# Patient Record
Sex: MALE | Race: WHITE | URBAN - METROPOLITAN AREA
[De-identification: names, ages, dates, MRNs, and addresses within clinical notes are randomized per-mention and may not be internally consistent; named-entity substitution may affect disease eponyms.]

---

## 2022-08-17 ENCOUNTER — EMERGENCY (EMERGENCY)
Facility: HOSPITAL | Age: 57
LOS: 0 days | Discharge: HOME | End: 2022-08-17
Attending: EMERGENCY MEDICINE
Payer: COMMERCIAL

## 2022-08-17 VITALS
SYSTOLIC BLOOD PRESSURE: 144 MMHG | OXYGEN SATURATION: 98 % | RESPIRATION RATE: 18 BRPM | TEMPERATURE: 99 F | HEART RATE: 78 BPM | DIASTOLIC BLOOD PRESSURE: 90 MMHG

## 2022-08-17 VITALS — HEIGHT: 70 IN | WEIGHT: 240.08 LBS

## 2022-08-17 DIAGNOSIS — Z90.49 ACQUIRED ABSENCE OF OTHER SPECIFIED PARTS OF DIGESTIVE TRACT: ICD-10-CM

## 2022-08-17 DIAGNOSIS — R11.0 NAUSEA: ICD-10-CM

## 2022-08-17 DIAGNOSIS — R35.0 FREQUENCY OF MICTURITION: ICD-10-CM

## 2022-08-17 DIAGNOSIS — Z20.822 CONTACT WITH AND (SUSPECTED) EXPOSURE TO COVID-19: ICD-10-CM

## 2022-08-17 DIAGNOSIS — R10.9 UNSPECIFIED ABDOMINAL PAIN: ICD-10-CM

## 2022-08-17 DIAGNOSIS — Z88.0 ALLERGY STATUS TO PENICILLIN: ICD-10-CM

## 2022-08-17 DIAGNOSIS — N20.0 CALCULUS OF KIDNEY: ICD-10-CM

## 2022-08-17 LAB
ALBUMIN SERPL ELPH-MCNC: 3.6 G/DL — SIGNIFICANT CHANGE UP (ref 3.3–5)
ALP SERPL-CCNC: 65 U/L — SIGNIFICANT CHANGE UP (ref 40–120)
ALT FLD-CCNC: 51 U/L — SIGNIFICANT CHANGE UP (ref 12–78)
ANION GAP SERPL CALC-SCNC: 8 MMOL/L — SIGNIFICANT CHANGE UP (ref 5–17)
APPEARANCE UR: CLEAR — SIGNIFICANT CHANGE UP
AST SERPL-CCNC: 23 U/L — SIGNIFICANT CHANGE UP (ref 15–37)
BASOPHILS # BLD AUTO: 0.04 K/UL — SIGNIFICANT CHANGE UP (ref 0–0.2)
BASOPHILS NFR BLD AUTO: 0.6 % — SIGNIFICANT CHANGE UP (ref 0–2)
BILIRUB SERPL-MCNC: 1.1 MG/DL — SIGNIFICANT CHANGE UP (ref 0.2–1.2)
BILIRUB UR-MCNC: NEGATIVE — SIGNIFICANT CHANGE UP
BUN SERPL-MCNC: 15 MG/DL — SIGNIFICANT CHANGE UP (ref 7–23)
CALCIUM SERPL-MCNC: 9.1 MG/DL — SIGNIFICANT CHANGE UP (ref 8.5–10.1)
CHLORIDE SERPL-SCNC: 111 MMOL/L — HIGH (ref 96–108)
CO2 SERPL-SCNC: 21 MMOL/L — LOW (ref 22–31)
COLOR SPEC: YELLOW — SIGNIFICANT CHANGE UP
CREAT SERPL-MCNC: 1.32 MG/DL — HIGH (ref 0.5–1.3)
DIFF PNL FLD: ABNORMAL
EGFR: 64 ML/MIN/1.73M2 — SIGNIFICANT CHANGE UP
EOSINOPHIL # BLD AUTO: 0.04 K/UL — SIGNIFICANT CHANGE UP (ref 0–0.5)
EOSINOPHIL NFR BLD AUTO: 0.6 % — SIGNIFICANT CHANGE UP (ref 0–6)
FLUAV AG NPH QL: SIGNIFICANT CHANGE UP
FLUBV AG NPH QL: SIGNIFICANT CHANGE UP
GLUCOSE SERPL-MCNC: 160 MG/DL — HIGH (ref 70–99)
GLUCOSE UR QL: NEGATIVE — SIGNIFICANT CHANGE UP
HCT VFR BLD CALC: 42.9 % — SIGNIFICANT CHANGE UP (ref 39–50)
HGB BLD-MCNC: 14.9 G/DL — SIGNIFICANT CHANGE UP (ref 13–17)
IMM GRANULOCYTES NFR BLD AUTO: 0.3 % — SIGNIFICANT CHANGE UP (ref 0–1.5)
KETONES UR-MCNC: NEGATIVE — SIGNIFICANT CHANGE UP
LACTATE SERPL-SCNC: 1.4 MMOL/L — SIGNIFICANT CHANGE UP (ref 0.7–2)
LEUKOCYTE ESTERASE UR-ACNC: NEGATIVE — SIGNIFICANT CHANGE UP
LIDOCAIN IGE QN: 158 U/L — SIGNIFICANT CHANGE UP (ref 73–393)
LYMPHOCYTES # BLD AUTO: 1.13 K/UL — SIGNIFICANT CHANGE UP (ref 1–3.3)
LYMPHOCYTES # BLD AUTO: 16 % — SIGNIFICANT CHANGE UP (ref 13–44)
MCHC RBC-ENTMCNC: 33.3 PG — SIGNIFICANT CHANGE UP (ref 27–34)
MCHC RBC-ENTMCNC: 34.7 GM/DL — SIGNIFICANT CHANGE UP (ref 32–36)
MCV RBC AUTO: 96 FL — SIGNIFICANT CHANGE UP (ref 80–100)
MONOCYTES # BLD AUTO: 0.55 K/UL — SIGNIFICANT CHANGE UP (ref 0–0.9)
MONOCYTES NFR BLD AUTO: 7.8 % — SIGNIFICANT CHANGE UP (ref 2–14)
NEUTROPHILS # BLD AUTO: 5.28 K/UL — SIGNIFICANT CHANGE UP (ref 1.8–7.4)
NEUTROPHILS NFR BLD AUTO: 74.7 % — SIGNIFICANT CHANGE UP (ref 43–77)
NITRITE UR-MCNC: NEGATIVE — SIGNIFICANT CHANGE UP
PH UR: 5 — SIGNIFICANT CHANGE UP (ref 5–8)
PLATELET # BLD AUTO: 215 K/UL — SIGNIFICANT CHANGE UP (ref 150–400)
POTASSIUM SERPL-MCNC: 3.7 MMOL/L — SIGNIFICANT CHANGE UP (ref 3.5–5.3)
POTASSIUM SERPL-SCNC: 3.7 MMOL/L — SIGNIFICANT CHANGE UP (ref 3.5–5.3)
PROT SERPL-MCNC: 7.2 GM/DL — SIGNIFICANT CHANGE UP (ref 6–8.3)
PROT UR-MCNC: NEGATIVE — SIGNIFICANT CHANGE UP
RBC # BLD: 4.47 M/UL — SIGNIFICANT CHANGE UP (ref 4.2–5.8)
RBC # FLD: 12.6 % — SIGNIFICANT CHANGE UP (ref 10.3–14.5)
RSV RNA NPH QL NAA+NON-PROBE: SIGNIFICANT CHANGE UP
SARS-COV-2 RNA SPEC QL NAA+PROBE: SIGNIFICANT CHANGE UP
SODIUM SERPL-SCNC: 140 MMOL/L — SIGNIFICANT CHANGE UP (ref 135–145)
SP GR SPEC: 1.03 — HIGH (ref 1.01–1.02)
UROBILINOGEN FLD QL: NEGATIVE — SIGNIFICANT CHANGE UP
WBC # BLD: 7.06 K/UL — SIGNIFICANT CHANGE UP (ref 3.8–10.5)
WBC # FLD AUTO: 7.06 K/UL — SIGNIFICANT CHANGE UP (ref 3.8–10.5)

## 2022-08-17 PROCEDURE — 36415 COLL VENOUS BLD VENIPUNCTURE: CPT

## 2022-08-17 PROCEDURE — 0241U: CPT

## 2022-08-17 PROCEDURE — 81001 URINALYSIS AUTO W/SCOPE: CPT

## 2022-08-17 PROCEDURE — 87086 URINE CULTURE/COLONY COUNT: CPT

## 2022-08-17 PROCEDURE — 80053 COMPREHEN METABOLIC PANEL: CPT

## 2022-08-17 PROCEDURE — 83605 ASSAY OF LACTIC ACID: CPT

## 2022-08-17 PROCEDURE — 99285 EMERGENCY DEPT VISIT HI MDM: CPT

## 2022-08-17 PROCEDURE — 85025 COMPLETE CBC W/AUTO DIFF WBC: CPT

## 2022-08-17 PROCEDURE — 74176 CT ABD & PELVIS W/O CONTRAST: CPT | Mod: 26,MA

## 2022-08-17 PROCEDURE — 74176 CT ABD & PELVIS W/O CONTRAST: CPT | Mod: MA

## 2022-08-17 PROCEDURE — 99284 EMERGENCY DEPT VISIT MOD MDM: CPT | Mod: 25

## 2022-08-17 PROCEDURE — 83690 ASSAY OF LIPASE: CPT

## 2022-08-17 PROCEDURE — 96374 THER/PROPH/DIAG INJ IV PUSH: CPT

## 2022-08-17 RX ORDER — SODIUM CHLORIDE 9 MG/ML
1000 INJECTION INTRAMUSCULAR; INTRAVENOUS; SUBCUTANEOUS ONCE
Refills: 0 | Status: DISCONTINUED | OUTPATIENT
Start: 2022-08-17 | End: 2022-08-17

## 2022-08-17 RX ORDER — ONDANSETRON 8 MG/1
4 TABLET, FILM COATED ORAL ONCE
Refills: 0 | Status: DISCONTINUED | OUTPATIENT
Start: 2022-08-17 | End: 2022-08-17

## 2022-08-17 RX ORDER — MORPHINE SULFATE 50 MG/1
4 CAPSULE, EXTENDED RELEASE ORAL ONCE
Refills: 0 | Status: DISCONTINUED | OUTPATIENT
Start: 2022-08-17 | End: 2022-08-17

## 2022-08-17 RX ORDER — TAMSULOSIN HYDROCHLORIDE 0.4 MG/1
1 CAPSULE ORAL
Qty: 7 | Refills: 0
Start: 2022-08-17 | End: 2022-08-23

## 2022-08-17 RX ORDER — CEFTRIAXONE 500 MG/1
1000 INJECTION, POWDER, FOR SOLUTION INTRAMUSCULAR; INTRAVENOUS ONCE
Refills: 0 | Status: DISCONTINUED | OUTPATIENT
Start: 2022-08-17 | End: 2022-08-17

## 2022-08-17 RX ORDER — ONDANSETRON 8 MG/1
1 TABLET, FILM COATED ORAL
Qty: 10 | Refills: 0
Start: 2022-08-17

## 2022-08-17 RX ORDER — KETOROLAC TROMETHAMINE 30 MG/ML
15 SYRINGE (ML) INJECTION ONCE
Refills: 0 | Status: DISCONTINUED | OUTPATIENT
Start: 2022-08-17 | End: 2022-08-17

## 2022-08-17 RX ADMIN — Medication 15 MILLIGRAM(S): at 22:25

## 2022-08-17 NOTE — ED STATDOCS - CARE PROVIDER_API CALL
Negro Rios)  Gastroenterology; Internal Medicine  [address] [place] [address]  Phone: [phone]  Fax: [phone]  Follow Up Time:     Ruddy Gross)  Gastroenterology; Internal Medicine  [address] [place] [address]  Phone: [phone]  Fax: [phone]  Follow Up Time:

## 2022-08-17 NOTE — ED STATDOCS - ATTENDING APP SHARED VISIT CONTRIBUTION OF CARE
I, Valentín Troy MD, personally saw the patient with ZACHARY.  I have personally performed a face to face diagnostic evaluation on this patient.  I have reviewed the ZACHARY note and agree with the history, exam, and plan of care, except as noted.

## 2022-08-17 NOTE — ED STATDOCS - PHYSICAL EXAMINATION
General: AAOx3, NAD  HEENT: NCAT  Cardiac: Normal rate and rhythym, no murmurs, normal peripheral perfusion  Respiratory: Normal rate and effort. CTAB  GI: Soft, nondistended, mild RLQ TTP  Neuro: No focal deficits. JONES equally x4, sensation to light touch intact throughout  MSK: FROMx4, no focal bony tenderness, no peripheral edema  Skin: No rash

## 2022-08-17 NOTE — ED STATDOCS - NSFOLLOWUPINSTRUCTIONS_ED_ALL_ED_FT
FOLLOW UP WITH YOUR UROLOGIST THIS WEEK. FOLLOW UP WITH A GASTROENTEROLOGIST FOR YOUR GALLSTONES, FATTY LIVER, AND DIVERTICULOSIS. YOU SHOULD GET A COLONOSCOPY WITHIN THE NEXT FEW MONTHS. FOLLOW UP WITH YOUR PRIMARY DOCTOR ABOUT THE LUNG NODULE. YOU SHOULD GET A CAT SCAN OF YOUR CHEST AS AN OUTPATIENT. RETURN TO ER FOR ANY WORSENING SYMPTOMS OR NEW CONCERNS.     Kidney Stones    WHAT YOU NEED TO KNOW:    Kidney stones form in the urinary system when the water and waste in your urine are out of balance. When this happens, certain types of waste crystals separate from the urine. The crystals build up and form kidney stones. You may have more than one kidney stone.     DISCHARGE INSTRUCTIONS:    Return to the emergency department if:     You have vomiting that is not relieved by medicine.        Contact your healthcare provider if:     You have a fever.       You have trouble passing urine.      You see blood in your urine.      You have severe pain.      You have any questions or concerns about your condition or care.    Medicines:     NSAIDs, such as ibuprofen, help decrease swelling, pain, and fever. This medicine is available with or without a doctor's order. NSAIDs can cause stomach bleeding or kidney problems in certain people. If you take blood thinner medicine, always ask your healthcare provider if NSAIDs are safe for you. Always read the medicine label and follow directions.      Prescription pain medicine may be given. Ask your healthcare provider how to take this medicine safely. Some prescription pain medicines contain acetaminophen. Do not take other medicines that contain acetaminophen without talking to your healthcare provider. Too much acetaminophen may cause liver damage. Prescription pain medicine may cause constipation. Ask your healthcare provider how to prevent or treat constipation.       Medicines to balance your electrolytes may be needed.       Take your medicine as directed. Contact your healthcare provider if you think your medicine is not helping or if you have side effects. Tell him or her if you are allergic to any medicine. Keep a list of the medicines, vitamins, and herbs you take. Include the amounts, and when and why you take them. Bring the list or the pill bottles to follow-up visits. Carry your medicine list with you in case of an emergency.    Follow up with your healthcare provider as directed: You may need to return for more tests. Write down your questions so you remember to ask them during your visits.    What you can do to manage kidney stones:     Drink more liquids. Your healthcare provider may tell you to drink at least 8 to 12 (eight-ounce) cups of liquids each day. This helps flush out the kidney stones when you urinate. Water is the best liquid to drink.      Strain your urine every time you go to the bathroom. Urinate through a strainer or a piece of thin cloth to catch the stones. Take the stones to your healthcare provider so they can be sent to the lab for tests. This will help your healthcare providers plan the best treatment for you.Look for Stones in the Filter           Eat a variety of healthy foods. Healthy foods include fruits, vegetables, whole-grain breads, low-fat dairy products, beans, and fish. You may need to limit how much sodium (salt) or protein you eat. Ask for information about the best foods for you.      Stay active. Your stones may pass more easily if you stay active. Exercise can also help you manage your weight. Ask about the best activities for you.    After you pass the kidney stones: Your healthcare provider may order a 24-hour urine test. Results from a 24-hour urine test will help your healthcare provider plan ways to prevent more stones from forming. Your healthcare provider will give you more instructions.

## 2022-08-17 NOTE — ED STATDOCS - NS ED ROS FT
Constitutional: No fevers, chills, or sweats.  Cardiac: No chest pain, exertional dyspnea, orthopnea  Respiratory: No shortness of breath, no cough  GI: +abdominal pain, +nausea, no V/D  Neuro: No headaches, no neck pain/stiffness, no numbness  All other systems reviewed and are negative unless otherwise stated in the HPI.

## 2022-08-17 NOTE — ED STATDOCS - PROGRESS NOTE DETAILS
signed Lesia Bernard PA-C Pt seen and evaluated initially in intake by Dr Troy.   56M with sudden onset severe RLQ/groin pain this afternoon. CT with 2mm UVJ stone, mild hydro. Pt said he felt much better shortly after the CT. UA no apparent uti. No significant findings on labwork. Incidental findings discussed with pt including lung nodule (written as emergent f/u though this likely should be non-emergent. attempt to reach Dr Craig radiology for clarification was unsuccessful) outpt f/u. Gallstones, fatty liver, and diverticulosis f/u GI. pt nontender RUQ. f/u with pts urologist for kidney stone and prostate (known to be enlarged to pt). Pt already aware of renal hypodensities. rx flomax, percocet, zofran. return precautions given. Pt ambulates with ease unassisted in ED. Pt feeling well at DC, agrees with DC and plan of care.

## 2022-08-17 NOTE — ED STATDOCS - OBJECTIVE STATEMENT
56 year old male with PMHx of PVCs not on any meds or no pacemaker,  s/p appendectomy 10 years ago presents to the ED c/o right sided abdominal pain x 4pm. Pt states that the pain stopped after 30 minutes but restarted about an hour ago. States that the pain is constant when it is present. Reports some mild nausea but no vomiting. Pt reports urinary frequency since 1pm today but no associated pain with urination. Denies scrotum pain. Did not take anything for pain PTA. Denies any radiation of pain. Denies prior occurrence. Denies h/o HTN or HLD. Allergy to Penicillin.

## 2022-08-17 NOTE — ED ADULT NURSE NOTE - OBJECTIVE STATEMENT
Pt presents to ED c/o RLQ pain x a few hours. NO dysuria or hematuria, some nausea, no vomiting. skin warm and dry. respirations even and unlabored. iv placed, labs drawn and sent.

## 2022-08-17 NOTE — ED STATDOCS - NS_ ATTENDINGSCRIBEDETAILS _ED_A_ED_FT
I, Valentín Troy MD,  performed the initial face to face bedside interview with this patient regarding history of present illness, review of symptoms and relevant past medical, social and family history.  I completed an independent physical examination.  I was the initial provider who evaluated this patient. I have signed out the follow up of any pending tests (i.e. labs, radiological studies) to the ZACHARY.  I have communicated the patient’s plan of care and disposition with the ZACHARY.  The history, relevant review of systems, past medical and surgical history, medical decision making, and physical examination was documented by the scribe in my presence and I attest to the accuracy of the documentation.

## 2022-08-17 NOTE — ED STATDOCS - SCRIBE NAME
Airway patent, nasal mucosa clear, mouth with normal mucosa. Throat has no vesicles, no oropharyngeal exudates and uvula is midline. Clear tympanic membranes bilaterally. lips mild dryness, small scattered fading, pink crusted over vesicles around mouth area
Saqib Gastelum

## 2022-08-17 NOTE — ED STATDOCS - PATIENT PORTAL LINK FT
You can access the FollowMyHealth Patient Portal offered by [facility] by registering at the following website: [email or web address]. By joining [email or web address] [email or web address] portal, you will also be able to view your health information using other applications (apps) compatible with our system.

## 2022-08-19 LAB
CULTURE RESULTS: SIGNIFICANT CHANGE UP
SPECIMEN SOURCE: SIGNIFICANT CHANGE UP

## 2022-11-28 ENCOUNTER — EMERGENCY (EMERGENCY)
Facility: HOSPITAL | Age: 57
LOS: 0 days | Discharge: LEFT BEFORE DISCHARGE | End: 2022-11-28

## 2022-11-28 VITALS — WEIGHT: 240.08 LBS | HEIGHT: 70 IN

## 2022-11-28 PROBLEM — I49.3 VENTRICULAR PREMATURE DEPOLARIZATION: Chronic | Status: ACTIVE | Noted: 2022-08-19

## 2022-11-28 PROBLEM — Z90.49 ACQUIRED ABSENCE OF OTHER SPECIFIED PARTS OF DIGESTIVE TRACT: Chronic | Status: ACTIVE | Noted: 2022-08-19

## 2022-11-28 PROCEDURE — L9991: CPT

## 2022-11-28 NOTE — ED ADULT TRIAGE NOTE - CHIEF COMPLAINT QUOTE
tripped and fell a week ago, landing onto right side, presents today with c/o right sided rib pain. + pain upon inspiration.

## 2022-11-28 NOTE — ED ADULT TRIAGE NOTE - BANDS:
Subjective:       Patient ID: Leonid Sidhu is a 49 y.o. male.    Vitals:  height is 6' (1.829 m) and weight is 89.4 kg (197 lb). His oral temperature is 97.2 °F (36.2 °C). His blood pressure is 170/100 (abnormal) and his pulse is 68. His respiration is 18 and oxygen saturation is 97%.     Chief Complaint: Pain    Pt states he is experiencing pain in his right elbow starting yesterday. Pt states elbow is swollen, painful, and red. Pt denies any trauma or injury. Pt states symptoms worsened this morning. No hx of gout. No wounds over the area. No fever, chills, weakness, NV.      Pain   This is a new problem. The problem has been gradually worsening. Associated symptoms include joint swelling. Pertinent negatives include no abdominal pain, chest pain, chills, fever, headaches, nausea, rash, sore throat or vomiting.     Review of Systems   Constitution: Negative for chills and fever.   HENT: Negative for sore throat.    Eyes: Negative for blurred vision.   Cardiovascular: Negative for chest pain.   Respiratory: Negative for shortness of breath.    Skin: Negative for rash.   Musculoskeletal: Positive for joint pain and joint swelling. Negative for back pain.   Gastrointestinal: Negative for abdominal pain, diarrhea, nausea and vomiting.   Neurological: Negative for headaches.   Psychiatric/Behavioral: The patient is not nervous/anxious.        Objective:      Physical Exam   Constitutional: He is oriented to person, place, and time. He appears well-developed and well-nourished.   HENT:   Head: Normocephalic and atraumatic. Head is without abrasion, without contusion and without laceration.   Right Ear: External ear normal.   Left Ear: External ear normal.   Nose: Nose normal.   Mouth/Throat: Oropharynx is clear and moist.   Eyes: Conjunctivae, EOM and lids are normal. Pupils are equal, round, and reactive to light.   Neck: Trachea normal, full passive range of motion without pain and phonation normal. Neck supple.    Cardiovascular: Normal rate, regular rhythm and normal heart sounds.   Pulmonary/Chest: Effort normal and breath sounds normal. No stridor. No respiratory distress.   Musculoskeletal: Normal range of motion.        Right elbow: He exhibits swelling and effusion. He exhibits normal range of motion. Tenderness found. Olecranon process tenderness noted.   Neurological: He is alert and oriented to person, place, and time.   Skin: Skin is warm, dry and intact. Capillary refill takes less than 2 seconds. No abrasion, no bruising, no burn, no ecchymosis, no laceration, no lesion and no rash noted. There is erythema.        Psychiatric: He has a normal mood and affect. His speech is normal and behavior is normal. Judgment and thought content normal. Cognition and memory are normal.   Nursing note and vitals reviewed.      Assessment:       1. Swelling of right elbow    2. Tobacco dependence        Plan:         Swelling of right elbow  Comments:  likely gout  Orders:  -     betamethasone acetate-betamethasone sodium phosphate injection 6 mg; Inject 1 mL (6 mg total) into the muscle one time.  -     naproxen (NAPROSYN) 500 MG tablet; Take 1 tablet (500 mg total) by mouth 2 (two) times daily with meals. for 7 days  Dispense: 14 tablet; Refill: 0  -     methylPREDNISolone (MEDROL DOSEPACK) 4 mg tablet; Take 1 tablet (4 mg total) by mouth once daily. use as directed for 6 days  Dispense: 1 Package; Refill: 0  -     Uric acid    Tobacco dependence  -     Ambulatory referral to Smoking Cessation Program    can consider cellulitis if no improvement with naproxen and medrol pack and uric acid normal - he has no wounds/abrasions over the area and no trauma to the elbow. Advised f/u with pcp    Advised pt to take bp again when well - if still elevated f/u with pcp.       Patient Instructions     PLEASE READ YOUR DISCHARGE INSTRUCTIONS ENTIRELY AS IT CONTAINS IMPORTANT INFORMATION.        naproxen for pain. You should take an over  Allergy; the counter antacid (zantac, Nexium, Prilosec) to protect your stomach. Eat with this medication.      You received a steroid shot today - this can elevate your blood pressure, elevate your blood sugar, water weight gain, nervous energy, redness to the face and dimpling of the skin where the shot goes in.   Do not use steroids more than 3 times per year.   If you have diabetes, please check you blood sugar frequently.  If you have high blood pressure, please check your blood pressure frequently.      We will call you once the blood test results    Please follow up with your primary care doctor for further treatment if your symptoms do not improve.      If your symptoms worsen in the mean time (numbness to the area, inability to use your extremity, severely worsening pain or swelling, fever, blood in your stool or dark brown stool) please go to the emergency room for further treatment.       Treating Gout Attacks     Raising the joint above the level of your heart can help reduce gout symptoms.     Gout is a disease that affects the joints. It is caused by excess uric acid in your blood stream that may lead to crystals forming in your joints. Left untreated, it can lead to painful foot and joint deformities and even kidney problems. But, by treating gout early, you can relieve pain and help prevent future problems. Gout can usually be treated with medication and proper diet. In severe cases, surgery may be needed.  Gout attacks are painful and often happen more than once. Taking medications may reduce pain and prevent attacks in the future. There are also some things you can do at home to relieve symptoms.  Medications for gout  Your healthcare provider may prescribe a daily medication to reduce levels of uric acid. Reducing your uric acid levels may help prevent gout attacks. Allopurinol is one commonly used medication taken daily to reduce uric acid levels. Other medications can help relieve pain and swelling  during an acute attack. Medicines such as NSAIDs (nonsteroidal anti-inflammatory medicines), steroids, and colchicine may be prescribed for intermittent use to relieve an acute gout attack. Be sure to take your medication as directed.  What you can do  Below are some things you can do at home to relieve gout symptoms. Your healthcare provider may have other tips.  · Rest the painful joint as much as you can.  · Raise the painful joint so it is at a level higher than your heart.  · Use ice for 10 minutes every 1-2 hours as possible.  How can I prevent gout?  With a little effort, you may be able to prevent gout attacks in the future. Here are some things you can do:  · Avoid foods high in purines  ¨ Certain meats (red meat, processed meat, turkey)  ¨ Organ meats (kidney, liver, sweetbread)  ¨ Shellfish (lobster, crab, shrimp, scallop, mussel)  ¨ Certain fish (anchovy, sardine, herring, mackerel)  · Take any medications prescribed by your healthcare provider.  · Lose weight if you need to.  · Reduce high fructose corn syrup in meals and drinks.  · Reduce or eliminate consumption of alcohol, particularly beer, but also red wine and spirits.  · Control blood pressure, diabetes, and cholesterol.  · Drink plenty of water to help flush uric acid from your body.  Date Last Reviewed: 2/1/2016  © 7258-7389 [employer]. [address] [place] [address]. All rights reserved. This information is not intended as a substitute for professional medical care. Always follow your healthcare professional's instructions.        What Is Gout?  Gout is a disease that affects the joints. Left untreated, it can lead to painful foot and joint deformities and even kidney problems. But, by treating gout early, you can relieve pain and help prevent future problems. Gout can usually be treated with medication and proper diet. In severe cases, surgery may be needed.  What causes gout?  Gout is caused by an excess of uric  "acid (a waste product made by the body). Uric acid is excreted by the kidneys. If the uric acid level in your blood rises too high, the uric acid may form crystals that collect in the joints, bringing on a gout attack. If you have many gout attacks, crystals may form large deposits called tophi. Tophi can damage joints and cause deformity.  Who is at risk for gout?  Men are more likely to have gout than women. But women can also be affected, mostly after menopause. Some health problems, such as obesity and high cholesterol, make gout more likely. And some medications, such as diuretics (water pills), can trigger a gout attack. People who drink a lot of alcohol are at high risk for gout. Certain foods can also trigger a gout attack.  Substances that may trigger a gout attack  To help prevent a gout attack, avoid these foods:  · Alcohol (particularly beer, but also red wine and spirits)  · Certain meats (red meat, processed meat, turkey)  · Organ meats (kidney, liver, sweetbread)  · Shellfish (lobster, crab, shrimp, scallop, mussel)  · Certain fish (anchovy, sardine, herring, mackerel)   Treatment  · Lifestyle changes, including weight loss, exercise, and quitting tobacco use  · Reducing consumption of the food groups above as well as high fructose corn syrup, found in many foods including sodas and energy drinks  · Changing non-essential medications that may contribute to gout (such as thiazide diuretics--"water pills")  · Medications to reduce the amount of uric acid in the blood, such as allopurinol or others  · Medications to treat acute gout attacks, including NSAIDs (nonsteroidal anti-inflammatory medicines), steroids, and colchicine  Date Last Reviewed: 2/1/2016  © 9766-1484 [employer]. [address] [place] [address], [place] [address]. All rights reserved. This information is not intended as a substitute for professional medical care. Always follow your healthcare professional's " instructions.

## 2022-11-29 DIAGNOSIS — Z53.21 PROCEDURE AND TREATMENT NOT CARRIED OUT DUE TO PATIENT LEAVING PRIOR TO BEING SEEN BY HEALTH CARE PROVIDER: ICD-10-CM
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
## 2022-11-29 DIAGNOSIS — R07.81 PLEURODYNIA: ICD-10-CM
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
## 2023-03-01 ENCOUNTER — APPOINTMENT (OUTPATIENT)
Dept: OTOLARYNGOLOGY | Facility: CLINIC | Age: 58
End: 2023-03-01

## 2023-12-28 ENCOUNTER — EMERGENCY (EMERGENCY)
Facility: HOSPITAL | Age: 58
LOS: 0 days | Discharge: HOME | End: 2023-12-28
Attending: STUDENT IN AN ORGANIZED HEALTH CARE EDUCATION/TRAINING PROGRAM
Payer: COMMERCIAL

## 2023-12-28 VITALS — HEIGHT: 70 IN | WEIGHT: 250 LBS

## 2023-12-28 VITALS
RESPIRATION RATE: 17 BRPM | DIASTOLIC BLOOD PRESSURE: 96 MMHG | TEMPERATURE: 100 F | HEART RATE: 101 BPM | OXYGEN SATURATION: 95 % | SYSTOLIC BLOOD PRESSURE: 165 MMHG

## 2023-12-28 DIAGNOSIS — R10.9 UNSPECIFIED ABDOMINAL PAIN: ICD-10-CM

## 2023-12-28 DIAGNOSIS — R50.9 FEVER, UNSPECIFIED: ICD-10-CM

## 2023-12-28 DIAGNOSIS — R00.0 TACHYCARDIA, UNSPECIFIED: ICD-10-CM

## 2023-12-28 DIAGNOSIS — R05.9 COUGH, UNSPECIFIED: ICD-10-CM

## 2023-12-28 DIAGNOSIS — U07.1 COVID-19: ICD-10-CM

## 2023-12-28 DIAGNOSIS — J11.1 INFLUENZA DUE TO UNIDENTIFIED INFLUENZA VIRUS WITH OTHER RESPIRATORY MANIFESTATIONS: ICD-10-CM

## 2023-12-28 LAB
ALBUMIN SERPL ELPH-MCNC: 3.8 G/DL — SIGNIFICANT CHANGE UP (ref 3.3–5)
ALBUMIN SERPL ELPH-MCNC: 3.8 G/DL — SIGNIFICANT CHANGE UP (ref 3.3–5)
ALP SERPL-CCNC: 68 U/L — SIGNIFICANT CHANGE UP (ref 40–120)
ALP SERPL-CCNC: 68 U/L — SIGNIFICANT CHANGE UP (ref 40–120)
ALT FLD-CCNC: 46 U/L — SIGNIFICANT CHANGE UP (ref 12–78)
ALT FLD-CCNC: 46 U/L — SIGNIFICANT CHANGE UP (ref 12–78)
ANION GAP SERPL CALC-SCNC: 5 MMOL/L — SIGNIFICANT CHANGE UP (ref 5–17)
ANION GAP SERPL CALC-SCNC: 5 MMOL/L — SIGNIFICANT CHANGE UP (ref 5–17)
ANISOCYTOSIS BLD QL: SLIGHT — SIGNIFICANT CHANGE UP
ANISOCYTOSIS BLD QL: SLIGHT — SIGNIFICANT CHANGE UP
APPEARANCE UR: CLEAR — SIGNIFICANT CHANGE UP
APPEARANCE UR: CLEAR — SIGNIFICANT CHANGE UP
AST SERPL-CCNC: 35 U/L — SIGNIFICANT CHANGE UP (ref 15–37)
AST SERPL-CCNC: 35 U/L — SIGNIFICANT CHANGE UP (ref 15–37)
BACTERIA # UR AUTO: NEGATIVE /HPF — SIGNIFICANT CHANGE UP
BACTERIA # UR AUTO: NEGATIVE /HPF — SIGNIFICANT CHANGE UP
BASOPHILS # BLD AUTO: 0.04 K/UL — SIGNIFICANT CHANGE UP (ref 0–0.2)
BASOPHILS # BLD AUTO: 0.04 K/UL — SIGNIFICANT CHANGE UP (ref 0–0.2)
BASOPHILS NFR BLD AUTO: 0.6 % — SIGNIFICANT CHANGE UP (ref 0–2)
BASOPHILS NFR BLD AUTO: 0.6 % — SIGNIFICANT CHANGE UP (ref 0–2)
BILIRUB SERPL-MCNC: 0.9 MG/DL — SIGNIFICANT CHANGE UP (ref 0.2–1.2)
BILIRUB SERPL-MCNC: 0.9 MG/DL — SIGNIFICANT CHANGE UP (ref 0.2–1.2)
BILIRUB UR-MCNC: NEGATIVE — SIGNIFICANT CHANGE UP
BILIRUB UR-MCNC: NEGATIVE — SIGNIFICANT CHANGE UP
BUN SERPL-MCNC: 15 MG/DL — SIGNIFICANT CHANGE UP (ref 7–23)
BUN SERPL-MCNC: 15 MG/DL — SIGNIFICANT CHANGE UP (ref 7–23)
CALCIUM SERPL-MCNC: 8.7 MG/DL — SIGNIFICANT CHANGE UP (ref 8.5–10.1)
CALCIUM SERPL-MCNC: 8.7 MG/DL — SIGNIFICANT CHANGE UP (ref 8.5–10.1)
CAST: 3 /LPF — SIGNIFICANT CHANGE UP (ref 0–4)
CAST: 3 /LPF — SIGNIFICANT CHANGE UP (ref 0–4)
CHLORIDE SERPL-SCNC: 110 MMOL/L — HIGH (ref 96–108)
CHLORIDE SERPL-SCNC: 110 MMOL/L — HIGH (ref 96–108)
CO2 SERPL-SCNC: 25 MMOL/L — SIGNIFICANT CHANGE UP (ref 22–31)
CO2 SERPL-SCNC: 25 MMOL/L — SIGNIFICANT CHANGE UP (ref 22–31)
COLOR SPEC: SIGNIFICANT CHANGE UP
COLOR SPEC: SIGNIFICANT CHANGE UP
CREAT SERPL-MCNC: 1.49 MG/DL — HIGH (ref 0.5–1.3)
CREAT SERPL-MCNC: 1.49 MG/DL — HIGH (ref 0.5–1.3)
DIFF PNL FLD: NEGATIVE — SIGNIFICANT CHANGE UP
DIFF PNL FLD: NEGATIVE — SIGNIFICANT CHANGE UP
EGFR: 55 ML/MIN/1.73M2 — LOW
EGFR: 55 ML/MIN/1.73M2 — LOW
EOSINOPHIL # BLD AUTO: 0.04 K/UL — SIGNIFICANT CHANGE UP (ref 0–0.5)
EOSINOPHIL # BLD AUTO: 0.04 K/UL — SIGNIFICANT CHANGE UP (ref 0–0.5)
EOSINOPHIL NFR BLD AUTO: 0.6 % — SIGNIFICANT CHANGE UP (ref 0–6)
EOSINOPHIL NFR BLD AUTO: 0.6 % — SIGNIFICANT CHANGE UP (ref 0–6)
FLUAV AG NPH QL: DETECTED
FLUAV AG NPH QL: DETECTED
FLUBV AG NPH QL: SIGNIFICANT CHANGE UP
FLUBV AG NPH QL: SIGNIFICANT CHANGE UP
GLUCOSE SERPL-MCNC: 112 MG/DL — HIGH (ref 70–99)
GLUCOSE SERPL-MCNC: 112 MG/DL — HIGH (ref 70–99)
GLUCOSE UR QL: NEGATIVE MG/DL — SIGNIFICANT CHANGE UP
GLUCOSE UR QL: NEGATIVE MG/DL — SIGNIFICANT CHANGE UP
HCT VFR BLD CALC: 43.5 % — SIGNIFICANT CHANGE UP (ref 39–50)
HCT VFR BLD CALC: 43.5 % — SIGNIFICANT CHANGE UP (ref 39–50)
HGB BLD-MCNC: 14.9 G/DL — SIGNIFICANT CHANGE UP (ref 13–17)
HGB BLD-MCNC: 14.9 G/DL — SIGNIFICANT CHANGE UP (ref 13–17)
IMM GRANULOCYTES NFR BLD AUTO: 0.3 % — SIGNIFICANT CHANGE UP (ref 0–0.9)
IMM GRANULOCYTES NFR BLD AUTO: 0.3 % — SIGNIFICANT CHANGE UP (ref 0–0.9)
KETONES UR-MCNC: NEGATIVE MG/DL — SIGNIFICANT CHANGE UP
KETONES UR-MCNC: NEGATIVE MG/DL — SIGNIFICANT CHANGE UP
LEUKOCYTE ESTERASE UR-ACNC: NEGATIVE — SIGNIFICANT CHANGE UP
LEUKOCYTE ESTERASE UR-ACNC: NEGATIVE — SIGNIFICANT CHANGE UP
LYMPHOCYTES # BLD AUTO: 0.46 K/UL — LOW (ref 1–3.3)
LYMPHOCYTES # BLD AUTO: 0.46 K/UL — LOW (ref 1–3.3)
LYMPHOCYTES # BLD AUTO: 7 % — LOW (ref 13–44)
LYMPHOCYTES # BLD AUTO: 7 % — LOW (ref 13–44)
MACROCYTES BLD QL: SLIGHT — SIGNIFICANT CHANGE UP
MACROCYTES BLD QL: SLIGHT — SIGNIFICANT CHANGE UP
MANUAL SMEAR VERIFICATION: SIGNIFICANT CHANGE UP
MANUAL SMEAR VERIFICATION: SIGNIFICANT CHANGE UP
MCHC RBC-ENTMCNC: 33.3 PG — SIGNIFICANT CHANGE UP (ref 27–34)
MCHC RBC-ENTMCNC: 33.3 PG — SIGNIFICANT CHANGE UP (ref 27–34)
MCHC RBC-ENTMCNC: 34.3 GM/DL — SIGNIFICANT CHANGE UP (ref 32–36)
MCHC RBC-ENTMCNC: 34.3 GM/DL — SIGNIFICANT CHANGE UP (ref 32–36)
MCV RBC AUTO: 97.3 FL — SIGNIFICANT CHANGE UP (ref 80–100)
MCV RBC AUTO: 97.3 FL — SIGNIFICANT CHANGE UP (ref 80–100)
MONOCYTES # BLD AUTO: 1.15 K/UL — HIGH (ref 0–0.9)
MONOCYTES # BLD AUTO: 1.15 K/UL — HIGH (ref 0–0.9)
MONOCYTES NFR BLD AUTO: 17.5 % — HIGH (ref 2–14)
MONOCYTES NFR BLD AUTO: 17.5 % — HIGH (ref 2–14)
NEUTROPHILS # BLD AUTO: 4.85 K/UL — SIGNIFICANT CHANGE UP (ref 1.8–7.4)
NEUTROPHILS # BLD AUTO: 4.85 K/UL — SIGNIFICANT CHANGE UP (ref 1.8–7.4)
NEUTROPHILS NFR BLD AUTO: 74 % — SIGNIFICANT CHANGE UP (ref 43–77)
NEUTROPHILS NFR BLD AUTO: 74 % — SIGNIFICANT CHANGE UP (ref 43–77)
NITRITE UR-MCNC: NEGATIVE — SIGNIFICANT CHANGE UP
NITRITE UR-MCNC: NEGATIVE — SIGNIFICANT CHANGE UP
PH UR: 5 — SIGNIFICANT CHANGE UP (ref 5–8)
PH UR: 5 — SIGNIFICANT CHANGE UP (ref 5–8)
PLAT MORPH BLD: NORMAL — SIGNIFICANT CHANGE UP
PLAT MORPH BLD: NORMAL — SIGNIFICANT CHANGE UP
PLATELET # BLD AUTO: 198 K/UL — SIGNIFICANT CHANGE UP (ref 150–400)
PLATELET # BLD AUTO: 198 K/UL — SIGNIFICANT CHANGE UP (ref 150–400)
PLATELET COUNT - ESTIMATE: NORMAL — SIGNIFICANT CHANGE UP
PLATELET COUNT - ESTIMATE: NORMAL — SIGNIFICANT CHANGE UP
POLYCHROMASIA BLD QL SMEAR: SLIGHT — SIGNIFICANT CHANGE UP
POLYCHROMASIA BLD QL SMEAR: SLIGHT — SIGNIFICANT CHANGE UP
POTASSIUM SERPL-MCNC: 4.2 MMOL/L — SIGNIFICANT CHANGE UP (ref 3.5–5.3)
POTASSIUM SERPL-MCNC: 4.2 MMOL/L — SIGNIFICANT CHANGE UP (ref 3.5–5.3)
POTASSIUM SERPL-SCNC: 4.2 MMOL/L — SIGNIFICANT CHANGE UP (ref 3.5–5.3)
POTASSIUM SERPL-SCNC: 4.2 MMOL/L — SIGNIFICANT CHANGE UP (ref 3.5–5.3)
PROT SERPL-MCNC: 7.5 GM/DL — SIGNIFICANT CHANGE UP (ref 6–8.3)
PROT SERPL-MCNC: 7.5 GM/DL — SIGNIFICANT CHANGE UP (ref 6–8.3)
PROT UR-MCNC: 30 MG/DL
PROT UR-MCNC: 30 MG/DL
RBC # BLD: 4.47 M/UL — SIGNIFICANT CHANGE UP (ref 4.2–5.8)
RBC # BLD: 4.47 M/UL — SIGNIFICANT CHANGE UP (ref 4.2–5.8)
RBC # FLD: 12.9 % — SIGNIFICANT CHANGE UP (ref 10.3–14.5)
RBC # FLD: 12.9 % — SIGNIFICANT CHANGE UP (ref 10.3–14.5)
RBC BLD AUTO: ABNORMAL
RBC BLD AUTO: ABNORMAL
RBC CASTS # UR COMP ASSIST: 1 /HPF — SIGNIFICANT CHANGE UP (ref 0–4)
RBC CASTS # UR COMP ASSIST: 1 /HPF — SIGNIFICANT CHANGE UP (ref 0–4)
RSV RNA NPH QL NAA+NON-PROBE: SIGNIFICANT CHANGE UP
RSV RNA NPH QL NAA+NON-PROBE: SIGNIFICANT CHANGE UP
SARS-COV-2 RNA SPEC QL NAA+PROBE: DETECTED
SARS-COV-2 RNA SPEC QL NAA+PROBE: DETECTED
SODIUM SERPL-SCNC: 140 MMOL/L — SIGNIFICANT CHANGE UP (ref 135–145)
SODIUM SERPL-SCNC: 140 MMOL/L — SIGNIFICANT CHANGE UP (ref 135–145)
SP GR SPEC: >1.03 — HIGH (ref 1–1.03)
SP GR SPEC: >1.03 — HIGH (ref 1–1.03)
SQUAMOUS # UR AUTO: 1 /HPF — SIGNIFICANT CHANGE UP (ref 0–5)
SQUAMOUS # UR AUTO: 1 /HPF — SIGNIFICANT CHANGE UP (ref 0–5)
TROPONIN I, HIGH SENSITIVITY RESULT: 10.75 NG/L — SIGNIFICANT CHANGE UP
TROPONIN I, HIGH SENSITIVITY RESULT: 10.75 NG/L — SIGNIFICANT CHANGE UP
UROBILINOGEN FLD QL: 0.2 MG/DL — SIGNIFICANT CHANGE UP (ref 0.2–1)
UROBILINOGEN FLD QL: 0.2 MG/DL — SIGNIFICANT CHANGE UP (ref 0.2–1)
WBC # BLD: 6.56 K/UL — SIGNIFICANT CHANGE UP (ref 3.8–10.5)
WBC # BLD: 6.56 K/UL — SIGNIFICANT CHANGE UP (ref 3.8–10.5)
WBC # FLD AUTO: 6.56 K/UL — SIGNIFICANT CHANGE UP (ref 3.8–10.5)
WBC # FLD AUTO: 6.56 K/UL — SIGNIFICANT CHANGE UP (ref 3.8–10.5)
WBC UR QL: 1 /HPF — SIGNIFICANT CHANGE UP (ref 0–5)
WBC UR QL: 1 /HPF — SIGNIFICANT CHANGE UP (ref 0–5)

## 2023-12-28 PROCEDURE — 84484 ASSAY OF TROPONIN QUANT: CPT

## 2023-12-28 PROCEDURE — 71045 X-RAY EXAM CHEST 1 VIEW: CPT | Mod: 26

## 2023-12-28 PROCEDURE — 0241U: CPT

## 2023-12-28 PROCEDURE — 85025 COMPLETE CBC W/AUTO DIFF WBC: CPT

## 2023-12-28 PROCEDURE — 80053 COMPREHEN METABOLIC PANEL: CPT

## 2023-12-28 PROCEDURE — 99285 EMERGENCY DEPT VISIT HI MDM: CPT

## 2023-12-28 PROCEDURE — 81001 URINALYSIS AUTO W/SCOPE: CPT

## 2023-12-28 PROCEDURE — 94640 AIRWAY INHALATION TREATMENT: CPT

## 2023-12-28 PROCEDURE — 96374 THER/PROPH/DIAG INJ IV PUSH: CPT

## 2023-12-28 PROCEDURE — 71045 X-RAY EXAM CHEST 1 VIEW: CPT

## 2023-12-28 PROCEDURE — 93005 ELECTROCARDIOGRAM TRACING: CPT

## 2023-12-28 PROCEDURE — 96375 TX/PRO/DX INJ NEW DRUG ADDON: CPT

## 2023-12-28 PROCEDURE — 99285 EMERGENCY DEPT VISIT HI MDM: CPT | Mod: 25

## 2023-12-28 PROCEDURE — 87086 URINE CULTURE/COLONY COUNT: CPT

## 2023-12-28 PROCEDURE — 93010 ELECTROCARDIOGRAM REPORT: CPT

## 2023-12-28 PROCEDURE — 36415 COLL VENOUS BLD VENIPUNCTURE: CPT

## 2023-12-28 RX ORDER — ACETAMINOPHEN 500 MG
1000 TABLET ORAL ONCE
Refills: 0 | Status: COMPLETED | OUTPATIENT
Start: 2023-12-28 | End: 2023-12-28

## 2023-12-28 RX ORDER — SODIUM CHLORIDE 9 MG/ML
1000 INJECTION INTRAMUSCULAR; INTRAVENOUS; SUBCUTANEOUS ONCE
Refills: 0 | Status: COMPLETED | OUTPATIENT
Start: 2023-12-28 | End: 2023-12-28

## 2023-12-28 RX ORDER — ALBUTEROL 90 MCG
2 AEROSOL (GRAM) INHALATION
Qty: 1 | Refills: 0 | DISCHARGE
Start: 2023-12-28 | End: 2024-01-26

## 2023-12-28 RX ORDER — ALBUTEROL 90 UG/1
2 AEROSOL, METERED ORAL
Qty: 1 | Refills: 0
Start: 2023-12-28 | End: 2024-01-26

## 2023-12-28 RX ORDER — METOCLOPRAMIDE HCL 10 MG
10 TABLET ORAL ONCE
Refills: 0 | Status: COMPLETED | OUTPATIENT
Start: 2023-12-28 | End: 2023-12-28

## 2023-12-28 RX ORDER — ACETAMINOPHEN WITH CODEINE 300MG-30MG
1 TABLET ORAL
Qty: 15 | Refills: 0
Start: 2023-12-28 | End: 2024-01-01

## 2023-12-28 RX ORDER — ACETAMINOPHEN WITH CODEINE 300MG-30MG
1 TABLET ORAL ONCE
Refills: 0 | Status: DISCONTINUED | OUTPATIENT
Start: 2023-12-28 | End: 2023-12-28

## 2023-12-28 RX ORDER — IPRATROPIUM/ALBUTEROL SULFATE 18-103MCG
3 AEROSOL WITH ADAPTER (GRAM) INHALATION ONCE
Refills: 0 | Status: COMPLETED | OUTPATIENT
Start: 2023-12-28 | End: 2023-12-28

## 2023-12-28 RX ADMIN — SODIUM CHLORIDE 1000 MILLILITER(S): 9 INJECTION INTRAMUSCULAR; INTRAVENOUS; SUBCUTANEOUS at 14:34

## 2023-12-28 RX ADMIN — Medication 400 MILLIGRAM(S): at 14:36

## 2023-12-28 RX ADMIN — Medication 10 MILLIGRAM(S): at 15:15

## 2023-12-28 RX ADMIN — Medication 3 MILLILITER(S): at 14:29

## 2023-12-28 NOTE — ED STATDOCS - PATIENT PORTAL LINK FT
You can access the FollowMyHealth Patient Portal offered by [facility] by registering at the following website: [email or web address]. By joining [employer]’s FollowMyHealth portal, you will also be able to view your health information using other applications (apps) compatible with our system. You can access the FollowMyHealth Patient Portal offered by [facility] by registering at the following website: [email or web address]. By joining [employer]’s FollowMyHealth portal, you will also be able to view your health information using other applications (apps) compatible with our system.

## 2023-12-28 NOTE — ED ADULT TRIAGE NOTE - CHIEF COMPLAINT QUOTE
Patient presents to the ER with complaints of cough associated with a headache. Patient tested positive for Covid on 12/8 and states the cough has gotten worse. Denies chest pain, shortness of breath, fever, N/V/D.

## 2023-12-28 NOTE — ED STATDOCS - MUSCULOSKELETAL, MLM
range of motion is not limited and there is no muscle tenderness. tenderness to 6 o'clock position of R breast, no surrounding erythema or warmth, ?small nodule, no palpable abscess range of motion is not limited and there is no muscle tenderness

## 2023-12-28 NOTE — ED ADULT NURSE NOTE - OBJECTIVE STATEMENT
58y male, A&oX3, ambulatory from home presenting to ED for frequent dry cough & HA for x2 days. Pt tested +Covid 1 month ago. Pt having a hard time getting through sentences without being interrupted w/ dry cough. Endorses abdominal muscle pain r/t coughing. Denies chest pain, SOB, N/V/D, or urinary symptoms. Labs sent as per MD, drawn by LPN. EKG complete. Resp. even and unlabored when pt is at rest, no abdominal accessory muscle use. in NAD.

## 2023-12-28 NOTE — ED STATDOCS - OBJECTIVE STATEMENT
48 yo female w/PMHx GERD and DM presents to the ED c/o worsening R breast pain, redness, and warmth to touch, also c/o SOB. Pt seen at [facility] last night, had an ultrasound done of the chest, was given Tylenol and Motrin, and pt was put on antibiotics. +nausea. No recent long travel. Pt had b/l implants done in October. Pt also had a b/l reduction and lift done prior to getting the implants done. Last Tylenol taken last night. No urinary complaints, endorsing mild urinary frequency. Patient is a 57 yo male with 2 day hx of cough; covid 1 month ago; subjective fever; right sided abdominal pain and headache 2/2 to cough; no chest pain; no sob; no urinary complaints; no testicular pain; seen at urgent care; sent to ED for evaluation. Patient is a 59 yo male with 2 day hx of cough; covid 1 month ago; subjective fever; right sided abdominal pain and headache 2/2 to cough; no chest pain; no sob; no urinary complaints; no testicular pain; seen at urgent care; sent to ED for evaluation.

## 2023-12-28 NOTE — ED ADULT NURSE NOTE - NSFALLUNIVINTERV_ED_ALL_ED
Bed/Stretcher in lowest position, wheels locked, appropriate side rails in place/Call bell, personal items and telephone in reach/Instruct patient to call for assistance before getting out of bed/chair/stretcher/Non-slip footwear applied when patient is off stretcher/[place] to call system/Physically safe environment - no spills, clutter or unnecessary equipment/Purposeful proactive rounding/Room/bathroom lighting operational, light cord in reach Bed/Stretcher in lowest position, wheels locked, appropriate side rails in place/Call bell, personal items and telephone in reach/Instruct patient to call for assistance before getting out of bed/chair/stretcher/Non-slip footwear applied when patient is off stretcher/[place] to call system/Physically safe environment - no spills, clutter or unnecessary equipment/Purposeful proactive rounding/Room/bathroom lighting operational, light cord in reach

## 2023-12-28 NOTE — ED STATDOCS - PROGRESS NOTE DETAILS
pt seen with ER attending who has hx of COVID in early Dec resolved and developed cough that is not productive causing abdo pain and headache for the past 2 days, denies fevers reevaluated the patient who feels slightly better, discussed the plna to give steroids, inhaler and codeine for the cpought and that the cough may persist for weeks, he should return for worsening cough SOB fevers

## 2023-12-28 NOTE — ED ADULT NURSE NOTE - NSICDXPASTMEDICALHX_GEN_ALL_CORE_FT
PAST MEDICAL HISTORY:  Frequent PVCs     S/P appendectomy      no dysuria, no frequency, and no hematuria.

## 2023-12-28 NOTE — ED STATDOCS - ATTENDING APP SHARED VISIT CONTRIBUTION OF CARE
I, Mila Kenny DO,  performed the initial face to face bedside interview with this patient regarding history of present illness, review of symptoms and relevant past medical, social and family history.  I completed an independent physical examination.  I was the initial provider who evaluated this patient.   I personally saw the patient and performed a substantive portion of the visit including all aspects of the medical decision making.  I have signed out the follow up of any pending tests (i.e. labs, radiological studies) to the ACP.  I have communicated the patient’s plan of care and disposition with the ACP.

## 2023-12-28 NOTE — ED STATDOCS - CLINICAL SUMMARY MEDICAL DECISION MAKING FREE TEXT BOX
46 yo female with R breast pain, also with possible viral illness. Dedicated R breast ultrasound of area, screening EKG, CXR, labs, UA, and reeval. 48 yo female with R breast pain, also with possible viral illness. Dedicated R breast ultrasound of area, screening EKG, CXR, labs, UA, and reeval. 57 yo male with cough x 2 days; frontal headache; abdominal pain 2/2 to cough; non toxic; although patient meets sirs- likely 2/2 to viral illness; screening ekg, cxr, labs; trial nebs; re-eval closely 59 yo male with cough x 2 days; frontal headache; abdominal pain 2/2 to cough; non toxic; although patient meets sirs- likely 2/2 to viral illness; screening ekg, cxr, labs; trial nebs; re-eval closely

## 2023-12-28 NOTE — ED STATDOCS - NSFOLLOWUPINSTRUCTIONS_ED_ALL_ED_FT
drink plenty of fluid  use the inhaler up to every 4 hours as needed  steroids once a day for 5 days  codeine to help with the cough, every 8 hours  return to the ER for increasing shortness of breath, fevers,    care with spreading the flu to others

## 2023-12-29 LAB
CULTURE RESULTS: SIGNIFICANT CHANGE UP
CULTURE RESULTS: SIGNIFICANT CHANGE UP
SPECIMEN SOURCE: SIGNIFICANT CHANGE UP
SPECIMEN SOURCE: SIGNIFICANT CHANGE UP

## 2024-09-27 ENCOUNTER — INPATIENT (INPATIENT)
Facility: HOSPITAL | Age: 59
LOS: 3 days | Discharge: HOME | DRG: 313 | End: 2024-10-01
Attending: HOSPITALIST | Admitting: FAMILY MEDICINE
Payer: COMMERCIAL

## 2024-09-27 VITALS
HEART RATE: 85 BPM | RESPIRATION RATE: 17 BRPM | DIASTOLIC BLOOD PRESSURE: 102 MMHG | HEIGHT: 70 IN | SYSTOLIC BLOOD PRESSURE: 132 MMHG | OXYGEN SATURATION: 97 % | TEMPERATURE: 99 F

## 2024-09-27 DIAGNOSIS — R07.9 CHEST PAIN, UNSPECIFIED: ICD-10-CM

## 2024-09-27 LAB
ADD ON TEST-SPECIMEN IN LAB: SIGNIFICANT CHANGE UP
ALBUMIN SERPL ELPH-MCNC: 3.4 G/DL — SIGNIFICANT CHANGE UP (ref 3.3–5)
ALP SERPL-CCNC: 70 U/L — SIGNIFICANT CHANGE UP (ref 40–120)
ALT FLD-CCNC: 28 U/L — SIGNIFICANT CHANGE UP (ref 12–78)
ANION GAP SERPL CALC-SCNC: 7 MMOL/L — SIGNIFICANT CHANGE UP (ref 5–17)
APPEARANCE UR: CLEAR — SIGNIFICANT CHANGE UP
APTT BLD: 34.8 SEC — SIGNIFICANT CHANGE UP (ref 24.5–35.6)
AST SERPL-CCNC: 19 U/L — SIGNIFICANT CHANGE UP (ref 15–37)
BASOPHILS # BLD AUTO: 0.06 K/UL — SIGNIFICANT CHANGE UP (ref 0–0.2)
BASOPHILS NFR BLD AUTO: 0.9 % — SIGNIFICANT CHANGE UP (ref 0–2)
BILIRUB SERPL-MCNC: 1.2 MG/DL — SIGNIFICANT CHANGE UP (ref 0.2–1.2)
BILIRUB UR-MCNC: NEGATIVE — SIGNIFICANT CHANGE UP
BUN SERPL-MCNC: 18 MG/DL — SIGNIFICANT CHANGE UP (ref 7–23)
CALCIUM SERPL-MCNC: 9.2 MG/DL — SIGNIFICANT CHANGE UP (ref 8.5–10.1)
CHLORIDE SERPL-SCNC: 110 MMOL/L — HIGH (ref 96–108)
CO2 SERPL-SCNC: 23 MMOL/L — SIGNIFICANT CHANGE UP (ref 22–31)
COLOR SPEC: YELLOW — SIGNIFICANT CHANGE UP
CREAT SERPL-MCNC: 1.42 MG/DL — HIGH (ref 0.5–1.3)
D DIMER BLD IA.RAPID-MCNC: 688 NG/ML DDU — HIGH
DIFF PNL FLD: NEGATIVE — SIGNIFICANT CHANGE UP
EGFR: 58 ML/MIN/1.73M2 — LOW
EOSINOPHIL # BLD AUTO: 0.32 K/UL — SIGNIFICANT CHANGE UP (ref 0–0.5)
EOSINOPHIL NFR BLD AUTO: 4.9 % — SIGNIFICANT CHANGE UP (ref 0–6)
GLUCOSE SERPL-MCNC: 148 MG/DL — HIGH (ref 70–99)
GLUCOSE UR QL: NEGATIVE MG/DL — SIGNIFICANT CHANGE UP
HCT VFR BLD CALC: 43.5 % — SIGNIFICANT CHANGE UP (ref 39–50)
HGB BLD-MCNC: 14.2 G/DL — SIGNIFICANT CHANGE UP (ref 13–17)
IMM GRANULOCYTES NFR BLD AUTO: 0.3 % — SIGNIFICANT CHANGE UP (ref 0–0.9)
INR BLD: 1.08 RATIO — SIGNIFICANT CHANGE UP (ref 0.85–1.16)
KETONES UR-MCNC: NEGATIVE MG/DL — SIGNIFICANT CHANGE UP
LEUKOCYTE ESTERASE UR-ACNC: NEGATIVE — SIGNIFICANT CHANGE UP
LYMPHOCYTES # BLD AUTO: 1.46 K/UL — SIGNIFICANT CHANGE UP (ref 1–3.3)
LYMPHOCYTES # BLD AUTO: 22.3 % — SIGNIFICANT CHANGE UP (ref 13–44)
MCHC RBC-ENTMCNC: 31.9 PG — SIGNIFICANT CHANGE UP (ref 27–34)
MCHC RBC-ENTMCNC: 32.6 GM/DL — SIGNIFICANT CHANGE UP (ref 32–36)
MCV RBC AUTO: 97.8 FL — SIGNIFICANT CHANGE UP (ref 80–100)
MONOCYTES # BLD AUTO: 0.7 K/UL — SIGNIFICANT CHANGE UP (ref 0–0.9)
MONOCYTES NFR BLD AUTO: 10.7 % — SIGNIFICANT CHANGE UP (ref 2–14)
NEUTROPHILS # BLD AUTO: 3.99 K/UL — SIGNIFICANT CHANGE UP (ref 1.8–7.4)
NEUTROPHILS NFR BLD AUTO: 60.9 % — SIGNIFICANT CHANGE UP (ref 43–77)
NITRITE UR-MCNC: NEGATIVE — SIGNIFICANT CHANGE UP
PH UR: 5 — SIGNIFICANT CHANGE UP (ref 5–8)
PLATELET # BLD AUTO: 191 K/UL — SIGNIFICANT CHANGE UP (ref 150–400)
POTASSIUM SERPL-MCNC: 3.9 MMOL/L — SIGNIFICANT CHANGE UP (ref 3.5–5.3)
POTASSIUM SERPL-SCNC: 3.9 MMOL/L — SIGNIFICANT CHANGE UP (ref 3.5–5.3)
PROT SERPL-MCNC: 7.1 GM/DL — SIGNIFICANT CHANGE UP (ref 6–8.3)
PROT UR-MCNC: NEGATIVE MG/DL — SIGNIFICANT CHANGE UP
PROTHROM AB SERPL-ACNC: 12.7 SEC — SIGNIFICANT CHANGE UP (ref 9.9–13.4)
RBC # BLD: 4.45 M/UL — SIGNIFICANT CHANGE UP (ref 4.2–5.8)
RBC # FLD: 12.6 % — SIGNIFICANT CHANGE UP (ref 10.3–14.5)
SODIUM SERPL-SCNC: 140 MMOL/L — SIGNIFICANT CHANGE UP (ref 135–145)
SP GR SPEC: 1.03 — SIGNIFICANT CHANGE UP (ref 1–1.03)
TROPONIN I, HIGH SENSITIVITY RESULT: 11.26 NG/L — SIGNIFICANT CHANGE UP
UROBILINOGEN FLD QL: 0.2 MG/DL — SIGNIFICANT CHANGE UP (ref 0.2–1)
WBC # BLD: 6.55 K/UL — SIGNIFICANT CHANGE UP (ref 3.8–10.5)
WBC # FLD AUTO: 6.55 K/UL — SIGNIFICANT CHANGE UP (ref 3.8–10.5)

## 2024-09-27 PROCEDURE — 99285 EMERGENCY DEPT VISIT HI MDM: CPT

## 2024-09-27 PROCEDURE — 80048 BASIC METABOLIC PNL TOTAL CA: CPT

## 2024-09-27 PROCEDURE — 86900 BLOOD TYPING SEROLOGIC ABO: CPT

## 2024-09-27 PROCEDURE — 0523T NTRAPX C FFR W/3D FUNCJL MAP: CPT

## 2024-09-27 PROCEDURE — 92978 ENDOLUMINL IVUS OCT C 1ST: CPT | Mod: LD

## 2024-09-27 PROCEDURE — 86901 BLOOD TYPING SEROLOGIC RH(D): CPT

## 2024-09-27 PROCEDURE — C1887: CPT

## 2024-09-27 PROCEDURE — 80061 LIPID PANEL: CPT

## 2024-09-27 PROCEDURE — C1894: CPT

## 2024-09-27 PROCEDURE — C1769: CPT

## 2024-09-27 PROCEDURE — 86850 RBC ANTIBODY SCREEN: CPT

## 2024-09-27 PROCEDURE — 85025 COMPLETE CBC W/AUTO DIFF WBC: CPT

## 2024-09-27 PROCEDURE — 94640 AIRWAY INHALATION TREATMENT: CPT

## 2024-09-27 PROCEDURE — C1753: CPT

## 2024-09-27 PROCEDURE — 71275 CT ANGIOGRAPHY CHEST: CPT | Mod: 26,MC

## 2024-09-27 PROCEDURE — 76376 3D RENDER W/INTRP POSTPROCES: CPT

## 2024-09-27 PROCEDURE — 93458 L HRT ARTERY/VENTRICLE ANGIO: CPT | Mod: 59

## 2024-09-27 PROCEDURE — C1874: CPT

## 2024-09-27 PROCEDURE — C9600: CPT | Mod: LD

## 2024-09-27 PROCEDURE — 36415 COLL VENOUS BLD VENIPUNCTURE: CPT

## 2024-09-27 PROCEDURE — 99223 1ST HOSP IP/OBS HIGH 75: CPT

## 2024-09-27 PROCEDURE — C1764: CPT

## 2024-09-27 PROCEDURE — 33285 INSJ SUBQ CAR RHYTHM MNTR: CPT

## 2024-09-27 PROCEDURE — 93005 ELECTROCARDIOGRAM TRACING: CPT

## 2024-09-27 PROCEDURE — C1725: CPT

## 2024-09-27 PROCEDURE — 83036 HEMOGLOBIN GLYCOSYLATED A1C: CPT

## 2024-09-27 PROCEDURE — 71045 X-RAY EXAM CHEST 1 VIEW: CPT | Mod: 26

## 2024-09-27 PROCEDURE — 84484 ASSAY OF TROPONIN QUANT: CPT

## 2024-09-27 PROCEDURE — 82962 GLUCOSE BLOOD TEST: CPT

## 2024-09-27 PROCEDURE — 93306 TTE W/DOPPLER COMPLETE: CPT

## 2024-09-27 RX ORDER — ALFUZOSIN HYDROCHLORIDE 10 MG/1
1 TABLET ORAL
Refills: 0 | DISCHARGE

## 2024-09-27 RX ORDER — SODIUM CHLORIDE 0.9 % (FLUSH) 0.9 %
1000 SYRINGE (ML) INJECTION ONCE
Refills: 0 | Status: COMPLETED | OUTPATIENT
Start: 2024-09-27 | End: 2024-09-27

## 2024-09-27 RX ORDER — DUTASTERIDE 0.5 MG/1
1 CAPSULE, LIQUID FILLED ORAL
Refills: 0 | DISCHARGE

## 2024-09-27 RX ADMIN — Medication 1000 MILLILITER(S): at 15:33

## 2024-09-27 NOTE — H&P ADULT - NSHPOUTPATIENTPROVIDERS_GEN_ALL_CORE
Tamir Delgado, [facility]  Urology DR. Gimenez  PCP Dr. Veliz,  [facility]

## 2024-09-27 NOTE — ED ADULT NURSE NOTE - OBJECTIVE STATEMENT
pt BIB EMS from urgent care c/o chest pain and light headedness x1 day.  states that he walked from his office about 1 block when symptoms started. pt given 4 baby asa PTA at urgent care. pmh PVCs and sees a cardiologist at [place]. allergic to penicillin.

## 2024-09-27 NOTE — H&P ADULT - HISTORY OF PRESENT ILLNESS
59 yo male c/o 2 episodes of dizziness, diaphoresis and palpitations with chest pain/pressure 2/10. Pt has ho pvc and has seen an ep doctor and cardiologist in [place] Pt is a pleasant 59 yo male with PMHx  recent Pneumonia ( Aug 2024) BPH, loose stools associated with IBS,  Obesity , untreated JANAY and PVCs,  who presented to [place] ED c/o 2 episodes of dizziness, diaphoresis and palpitations with chest pain/pressure 2/10.  His symptoms  occured  at 12: 30 pm today.  Pt was walking from his office to the nearby pharmacy when he felt light headed and nearly fainted.   His wife took him home and he ate something.  However he still felt "hazy and tired."     Pt had a crown placed on a broken tooth yesterday,  he had received novocaine and took 600mg motrin.       He reports occas cramping discomfort in his back and chest right ribs.   He  denies SOB,  no n/v/ d,  no urinary complaints,  no edema/calf pain,  no URI complaints, no fever .  Pt drank 16 oz of coffee this AM and reports he does also drink soda with caffeine.     For a prior history of PVCs pt has seen  an EP doctor/ cardiologist from [place].

## 2024-09-27 NOTE — H&P ADULT - NSICDXPASTMEDICALHX_GEN_ALL_CORE_FT
PAST MEDICAL HISTORY:  BPH (benign prostatic hyperplasia)     Frequent PVCs     JANAY (obstructive sleep apnea)

## 2024-09-27 NOTE — ED PROVIDER NOTE - CLINICAL SUMMARY MEDICAL DECISION MAKING FREE TEXT BOX
pt with ho pvc today with 2 episodes of near syncope with pain/pressure, palpitations, dizziness and diaphoresis, was given  1 full aspirin given at UC

## 2024-09-27 NOTE — H&P ADULT - NSHPLABSRESULTS_GEN_ALL_CORE
my interpretation    14:17 EKG   Sinus rhythm,  85 bpm,  pvcs,  qs in v1v2, III    15:15 EKG Sinus rhythm , 80 bpm, pvcs, qs in v1v2, III        < from: CT Angio Chest PE Protocol w/ IV Cont (09.27.24 @ 17:05) >    ACC: [id number] EXAM:  CT ANGIO CHEST PULM ART WAWI   ORDERED BY: MELISSA GALLEGO     PROCEDURE DATE:  09/27/2024    INTERPRETATION:  CLINICAL INFORMATION: Shortness of breath. Elevated   d-dimer    COMPARISON: No prior examinations are available for comparison at the   time of this interpretation.    CONTRAST/COMPLICATIONS:  IV Contrast: Omnipaque 350  70 cc administered   0 cc discarded  Oral Contrast: NONE  Complications: None reported at time of study completion    PROCEDURE:  CT Angiography of the Chest.  Sagittal and coronal reformats were performed as well as 3D (MIP)   reconstructions.    FINDINGS:    LUNGS AND LARGE AIRWAYS: Patent central airways. Mild dependent   atelectasis. Calcified granulomata  PLEURA: Trace bilateral pleural effusions  VESSELS: Aortic calcifications. Coronary artery calcifications. No acute   pulmonary embolism  HEART: Heart size is normal. No pericardial effusion.  MEDIASTINUM AND LILI: No lymphadenopathy.  CHEST WALL AND LOWER NECK: Minimal bilateral gynecomastia  VISUALIZED UPPER ABDOMEN: Incidental cholelithiasis.  Hepatic steatosis with fat sparing adjacent to gallbladder fossa  BONES: Multilevel degenerative changes throughout the spine.    IMPRESSION:  No acute pulmonary embolism    Trace bilateral pleural effusions with adjacent atelectasis      --- End of Report ---  BRANDON STANTON MD; Attending Radiologist  This document has been electronically signed. Sep 27 2024  5:23PM  < end of copied text >

## 2024-09-27 NOTE — ED PROVIDER NOTE - PRINCIPAL DIAGNOSIS
"      [facility]   Cardiology Inpatient Consultation    December 15, 2020    Reason for Consult:  A cardiology consult was requested by Dr. Danny Rios from the emergency department service to provide clinical guidance regarding chest pain and syncope.    HPI:   Pily Gannon is a 59 year old male with known past medical history of HIV and TBI who presents today due to an episode of chest pain and syncope.  He recently moved from [place], [place] at the behest of his family due to this history of TBI and resultant poor cognitive function. Unfortunately, due to recently moving (no available records on CareEverywhere at this time) and his history of TBI, the patient can not provide any additional details about the remainder of his past medical history.    The patient presents to [facility] ER today due to complaints of a syncopal episode.  The patient notes that \"sometime\" this morning he was working on a step ladder, looking up.  He is unsure how long he was doing so, but developed acute \"dizziness\".  Describes his dizziness as the \"room spinning.\" With onset of dizziness, patient noted the development of left sided chest pian.  He reports nothing made the pain better or worse.  He describes the pain as a \"burning\" sensation. He rates the pain a  7 out of 10. He denies associated shortness of breath, nausea, paresthesias, and palpitations. He is unsure if he's ever had dizziness of chest pain prior. The patient estimates that his \"dizziness\" and \"chest pain\" lasted for a period of about 60 seconds before he \"passed out.\"  He endorses loss of consciousness.  He states that his brother witnessed his fall.  He is unsure if he hit his head.  He does not know how long he had lost consciousness for.      The patient then states he's \"not sure\" what happened after awakening from his fall. He is unsure how long he was without consciousness.   He does recall that his brother gave him 1 sublingual " "nitroglycerin (his bother's prescription); he is unsure if this did \"anything.\" Per review of the ER physician's note (whom spoke with family), the patient was then brought to [facility] for evaluation.      At present, the patient denies any degree of chest discomfort, shortness of breath, dizziness, or palpitations.  He has a 40 year smoking history, sometimes smoking 1.5 PPD.  He does not know if his family has any history of cardiac disease.  In the past two weeks, the patient otherwise denies additional episodes of chest pain, dyspnea at rest or with exertion, orthopnea, PND, palpitations, lightheadedness, nausea, vomiting, fever, chills, cough, abdominal pain, diarrhea/constipation, dysuria, new LE pain/swelling.    Review of Systems:    Complete review of systems was performed and negative except per HPI.    PMH:  HIV   Unable to recall additional history    Active Problems:  Chest pain  Syncope    Social History:  Social History     Tobacco Use     Smoking status: None   Substance Use Topics     Alcohol use: None     Drug use: None     Family History:  Patient is unable to recall.    Medications:    metoprolol tartrate  50 mg Oral Once       Physical Exam:  Temp:  [97.6  F (36.4  C)] 97.6  F (36.4  C)  Pulse:  [60-65] 60  Resp:  [13-19] 13  BP: (100-112)/(62-74) 101/73  SpO2:  [96 %-100 %] 100 %    GEN: Pleasant, no acute distress  HEENT: no icterus.  No evidence of cranial trauma.  CV: RRR, normal s1/s2, no murmurs/rubs/s3/s4, no heave. JVP not appreciably elevated.   CHEST: clear to ausculation bilaterally, no rales or wheezing  ABD: soft, non-tender, normal active bowel sounds  EXTR: No clubbing, cyanosis. There is no LE edema.  NEURO: alert oriented, speech fluent/appropriate, motor grossly non focal    Diagnostics:  EKG 12/15/2020: 0.5mm ST elevation in II, III, aVF.  No reciprocal changes.          Assessment & Plan   Pily Gannon is a 59 year old male with known past medical history of HIV and TBI " "(approximately 2 years ago) who presents today due to an episode of chest pain and syncope.  He recently moved from [place], [place] at the behest of his family due to this history of TBI with resultant poor cognitive function. Unfortunately, due to recently moving and his history of TBI, the patient can not provide any additional details about the remainder of his past medical history.    # Syncope  # Chest Pain  Poor historian.  Unclear events of the day.  Concerning story of chest pain + dizziness and eventual syncope.  + cardiac risk facts (tobacco use, unclear if patient also carries diagnosis of HTN/HLD/T2DM). Would like to rule out obstructive coronary artery disease/PE. Creatinine 1.2, GFR 72 - suitable for contrast administration.  - CT coronary angiogram this afternoon for assessment of coronary artery disease. Per imaging MD, will give 50mg metoprolol tartrate x1  - CT PE given recent flight, chest pain, syncope.  Concerning for PE  - if no coronary disease, would recommend admission for evaluation of syncope on observation service versus Cards1 service  - if patient has obstructive coronary artery disease, will gladly admit to CSI for evaluation of coronary angiogram  - should patient agree to admission:   - would consider CT head given fall, unclear if patient hit his head   - would consider formal echocardiogram for assessment of valve function should patient agree to admission   - would trend troponin x3 if patient is amenable to admission   - would initiate telemetry for assessment of arrhythmia    # Cognitive Dysfunction in the setting of TBI  Patient not \"sure\" if he wants to pursue admission.  Unclear cognitive function/patient decisional capacity  - recommend STAT Occupational therapy consult for MOCA evaluation  - consider social work referral         I have discussed the above with Dr. Edmund Orellana.    Thank you for consulting the cardiovascular services at the [facility] [place]. Please do not hesitate to call us with any questions.     Rajni Leigh PA-C  [facility] Cardiology Consult Team       Chest pain

## 2024-09-27 NOTE — ED PROVIDER NOTE - OBJECTIVE STATEMENT
59 yo male c/o 2 episodes of dizziness, diaphoresis and palpititations with chest pain/pressure. Pt has ho pvc and has seen an ep doctor and cardiologist in [place]

## 2024-09-27 NOTE — H&P ADULT - GASTROINTESTINAL
Colonoscopy History and Physical      The patient was seen and examined. Date of last colonoscopy: none, Polyps  No      The airway was assessed and documented. The problem list, past medical history, and medications were reviewed. There is no problem list on file for this patient. Social History     Social History    Marital status: SINGLE     Spouse name: N/A    Number of children: N/A    Years of education: N/A     Occupational History    Not on file. Social History Main Topics    Smoking status: Never Smoker    Smokeless tobacco: Never Used    Alcohol use No    Drug use: Not on file    Sexual activity: Not on file     Other Topics Concern    Not on file     Social History Narrative    No narrative on file     Past Medical History:   Diagnosis Date    Arthritis     right hip    Diabetes ([facility] [address]     Hypertension     Ill-defined condition     Lypoma in neck    Ill-defined condition     insomina    Liver disease 2017    fatty Liver         Prior to Admission Medications   Prescriptions Last Dose Informant Patient Reported? Taking?   lisinopril (PRINIVIL, ZESTRIL) 20 mg tablet 7/24/2017 at Unknown time  Yes Yes   Sig: Take  by mouth daily. metFORMIN (GLUCOPHAGE) 500 mg tablet 7/24/2017 at Unknown time  Yes Yes   Sig: Take 500 mg by mouth two (2) times daily (with meals). omeprazole (PRILOSEC) 40 mg capsule   Yes Yes   Sig: Take 40 mg by mouth daily. pantoprazole (PROTONIX) 20 mg tablet 7/24/2017 at Unknown time  Yes Yes   Sig: Take 40 mg by mouth daily. pravastatin (PRAVACHOL) 40 mg tablet 7/24/2017 at Unknown time  Yes Yes   Sig: Take 40 mg by mouth nightly. zolpidem (AMBIEN) 10 mg tablet 7/24/2017 at Unknown time  Yes Yes   Sig: Take  by mouth nightly as needed for Sleep. Facility-Administered Medications: None       The patient was seen and examined in the endoscopy suite. The airway was assessed and docuemented. The problem list and medications were reviewed.      Chief complaint, history of present illness, and review of systems and Past medical History are positive for: RUQ pain, chest pain, HTN and colon cancer screening. The heart, lungs and mental status were satisfactory for the administration of sedation and for the procedure. I discussed with the patient the objectives, risks, consequences and alternatives to the procedure.      Plan: Endoscopic procedure with sedation     Luis Alberto Jones MD   7/25/2017  1:27 PM soft/nontender/nondistended/normal active bowel sounds

## 2024-09-27 NOTE — ED ADULT TRIAGE NOTE - CHIEF COMPLAINT QUOTE
pt bibems from  c/o chest pain and light headedness x1 day. pt given 4 baby asa pta. pmh PVCs. allergic to penicillin.

## 2024-09-27 NOTE — H&P ADULT - NSICDXFAMILYHX_GEN_ALL_CORE_FT
FAMILY HISTORY:  Father  Still living? Unknown  Family history of ankylosing spondylitis, Age at diagnosis: Age Unknown  FHx: hypertension, Age at diagnosis: Age Unknown    Mother  Still living? Unknown  FHx: hypertension, Age at diagnosis: Age Unknown

## 2024-09-27 NOTE — H&P ADULT - TIME BILLING
I spent a total of 75 minutes on the date of this encounter coordinating the patient's care. This includes reviewing documentation pertinent to this admission, results and imaging in addition to completing a history and physical examination on the patient. Further tests, medications, and procedures have been ordered as indicated. Laboratory results and the plan of care were communicated to the patient and or  their family member. Supporting documentation was completed and added to the patient's chart.

## 2024-09-27 NOTE — H&P ADULT - ASSESSMENT
Pt is admitted w/      Presyncope  Light headedness  chest pressure  Freq PVCs  Arrythmia  abn EKG  Hyperglycemia  MARIBETH vs CKD    Recent dental crown placed yesterday due to broken tooth    Hx of Pna , 1 month ago  Hx of  BPH  Hx of JANAY untreated  Hx of  PVCs  Hx of Obesity    - s/p ASA 81mg x4  - s/p 1000 ml NS IVF in the ED  - admit to Telemetry monitoring   - will add metoprolol 25mg po every12 hours  - CTA is neg for PE, shows trace bilat pl eff  - echocardiogram  - continue home meds of dutasteride, alfuzosin  - cardiology consult requested w/ / Bonilla /Jp/Catarino  - chpeat labs  - outpt f/u with Pulmonary for untreated JANAY  - DVT proph on lovenox  - Full Code

## 2024-09-27 NOTE — H&P ADULT - NSHPPHYSICALEXAM_GEN_ALL_CORE
ICU Vital Signs Last 24 Hrs  T(C): 36.7 (27 Sep 2024 21:38), Max: 37.1 (27 Sep 2024 14:05)  T(F): 98 (27 Sep 2024 21:38), Max: 98.8 (27 Sep 2024 14:05)  HR: 85 (27 Sep 2024 21:38) (63 - 85)  BP: 149/92 (27 Sep 2024 21:38) (129/88 - 149/92)  BP(mean): 109 (27 Sep 2024 20:53) (99 - 109)    RR: 19 (27 Sep 2024 21:38) (16 - 19)  SpO2: 96% (27 Sep 2024 21:38) (95% - 97%)    O2 Parameters below as of 27 Sep 2024 21:38  Patient On (Oxygen Delivery Method): room air

## 2024-09-27 NOTE — ED PROVIDER NOTE - PHYSICAL EXAMINATION
Gen:  Well appearing in NAD  Head:  NC/AT  HEENT: pupils perrl,no pharyngeal erythema, uvula midline  Cardiac: S1S2, RRR  Abd: Soft, non tender  Resp: No distress, CTA   musculoskeletal:: no deformities, no swelling, strength +5/+5  Skin: warm and dry as visualized, no rashes  Neuro: JONES, aao x 4  Psych:alert, cooperative, appropriate mood and affect for situation

## 2024-09-27 NOTE — ED PROVIDER NOTE - PROGRESS NOTE DETAILS
pt with neg troponin, cta neg for pe,pt continues to have frequent pvc on telemetry. in earlier tlemetry strips pt may have had an intermittent or paroxysmal afib. concern for pt with symptomatic pvs, dw pt and wife at bedside, agrees to stay tba spoke with Dr. Richard Cerna DO

## 2024-09-27 NOTE — ED PROVIDER NOTE - CARE PLAN
Principal Discharge DX:	Chest pain  Secondary Diagnosis:	Palpitations  Secondary Diagnosis:	Frequent PVCs   1

## 2024-09-28 ENCOUNTER — RESULT REVIEW (OUTPATIENT)
Age: 59
End: 2024-09-28

## 2024-09-28 DIAGNOSIS — Z90.49 ACQUIRED ABSENCE OF OTHER SPECIFIED PARTS OF DIGESTIVE TRACT: Chronic | ICD-10-CM

## 2024-09-28 LAB
ANION GAP SERPL CALC-SCNC: 7 MMOL/L — SIGNIFICANT CHANGE UP (ref 5–17)
BUN SERPL-MCNC: 14 MG/DL — SIGNIFICANT CHANGE UP (ref 7–23)
CALCIUM SERPL-MCNC: 9 MG/DL — SIGNIFICANT CHANGE UP (ref 8.5–10.1)
CHLORIDE SERPL-SCNC: 112 MMOL/L — HIGH (ref 96–108)
CHOLEST SERPL-MCNC: 228 MG/DL — HIGH
CO2 SERPL-SCNC: 23 MMOL/L — SIGNIFICANT CHANGE UP (ref 22–31)
CREAT SERPL-MCNC: 1.34 MG/DL — HIGH (ref 0.5–1.3)
EGFR: 62 ML/MIN/1.73M2 — SIGNIFICANT CHANGE UP
GLUCOSE SERPL-MCNC: 110 MG/DL — HIGH (ref 70–99)
HDLC SERPL-MCNC: 39 MG/DL — LOW
LIPID PNL WITH DIRECT LDL SERPL: 159 MG/DL — HIGH
NON HDL CHOLESTEROL: 189 MG/DL — HIGH
POTASSIUM SERPL-MCNC: 4.2 MMOL/L — SIGNIFICANT CHANGE UP (ref 3.5–5.3)
POTASSIUM SERPL-SCNC: 4.2 MMOL/L — SIGNIFICANT CHANGE UP (ref 3.5–5.3)
SODIUM SERPL-SCNC: 142 MMOL/L — SIGNIFICANT CHANGE UP (ref 135–145)
TRIGL SERPL-MCNC: 160 MG/DL — HIGH
TROPONIN I, HIGH SENSITIVITY RESULT: 8.79 NG/L — SIGNIFICANT CHANGE UP

## 2024-09-28 PROCEDURE — 76376 3D RENDER W/INTRP POSTPROCES: CPT | Mod: 26

## 2024-09-28 PROCEDURE — 93010 ELECTROCARDIOGRAM REPORT: CPT

## 2024-09-28 PROCEDURE — 93306 TTE W/DOPPLER COMPLETE: CPT | Mod: 26

## 2024-09-28 PROCEDURE — 99233 SBSQ HOSP IP/OBS HIGH 50: CPT

## 2024-09-28 RX ORDER — ENOXAPARIN SODIUM 150 MG/ML
40 INJECTION SUBCUTANEOUS EVERY 24 HOURS
Refills: 0 | Status: DISCONTINUED | OUTPATIENT
Start: 2024-09-28 | End: 2024-09-30

## 2024-09-28 RX ORDER — FLUTICASONE PROPION/SALMETEROL 100-50 MCG
1 BLISTER, WITH INHALATION DEVICE INHALATION
Refills: 0 | Status: DISCONTINUED | OUTPATIENT
Start: 2024-09-28 | End: 2024-10-01

## 2024-09-28 RX ORDER — TAMSULOSIN HCL 0.4 MG
0.8 CAPSULE ORAL AT BEDTIME
Refills: 0 | Status: DISCONTINUED | OUTPATIENT
Start: 2024-09-28 | End: 2024-10-01

## 2024-09-28 RX ORDER — ASPIRIN 325 MG
81 TABLET ORAL DAILY
Refills: 0 | Status: DISCONTINUED | OUTPATIENT
Start: 2024-09-28 | End: 2024-10-01

## 2024-09-28 RX ORDER — METOPROLOL TARTRATE 50 MG
25 TABLET ORAL EVERY 12 HOURS
Refills: 0 | Status: DISCONTINUED | OUTPATIENT
Start: 2024-09-28 | End: 2024-09-30

## 2024-09-28 RX ORDER — FINASTERIDE 5 MG/1
5 TABLET, FILM COATED ORAL AT BEDTIME
Refills: 0 | Status: DISCONTINUED | OUTPATIENT
Start: 2024-09-28 | End: 2024-10-01

## 2024-09-28 RX ORDER — ALBUTEROL 90 MCG
2 AEROSOL (GRAM) INHALATION EVERY 6 HOURS
Refills: 0 | Status: DISCONTINUED | OUTPATIENT
Start: 2024-09-28 | End: 2024-10-01

## 2024-09-28 RX ORDER — ATORVASTATIN CALCIUM 10 MG/1
40 TABLET, FILM COATED ORAL AT BEDTIME
Refills: 0 | Status: DISCONTINUED | OUTPATIENT
Start: 2024-09-28 | End: 2024-10-01

## 2024-09-28 RX ADMIN — ATORVASTATIN CALCIUM 40 MILLIGRAM(S): 10 TABLET, FILM COATED ORAL at 21:19

## 2024-09-28 RX ADMIN — Medication 25 MILLIGRAM(S): at 09:06

## 2024-09-28 RX ADMIN — FINASTERIDE 5 MILLIGRAM(S): 5 TABLET, FILM COATED ORAL at 21:19

## 2024-09-28 RX ADMIN — Medication 25 MILLIGRAM(S): at 21:18

## 2024-09-28 RX ADMIN — Medication 1 DOSE(S): at 20:22

## 2024-09-28 RX ADMIN — Medication 0.8 MILLIGRAM(S): at 21:18

## 2024-09-28 RX ADMIN — ENOXAPARIN SODIUM 40 MILLIGRAM(S): 150 INJECTION SUBCUTANEOUS at 09:06

## 2024-09-28 NOTE — PROGRESS NOTE ADULT - SUBJECTIVE AND OBJECTIVE BOX
59 yo male with PMHx  recent Pneumonia ( Aug 2024) BPH, loose stools associated with IBS,  Obesity , untreated JANAY and PVCs,  who presented to [facility] ED c/o 2 episodes of dizziness, diaphoresis and palpitations with chest pain/pressure 2/10.  His symptoms  occured  at 12: 30 pm today.  Pt was walking from his office to the nearby pharmacy when he felt light headed and nearly fainted.   His wife took him home and he ate something.  However he still felt "hazy and tired."     Pt had a crown placed on a broken tooth yesterday,  he had received novocaine and took 600mg motrin.       He reports occas cramping discomfort in his back and chest right ribs.   He  denies SOB,  no n/v/ d,  no urinary complaints,  no edema/calf pain,  no URI complaints, no fever .  Pt drank 16 oz of coffee this AM and reports he does also drink soda with caffeine.     For a prior history of PVCs pt has seen  an EP doctor/ cardiologist from [place].    59 yo male with PMHx  recent Pneumonia ( Aug 2024) BPH, loose stools associated with IBS,  Obesity , untreated JANAY and PVCs,  who presented to [place] ED c/o 2 episodes of dizziness, diaphoresis and palpitations with chest pain/pressure 2/10.  His symptoms  occured  at 12: 30 pm today.  Pt was walking from his office to the nearby pharmacy when he felt light headed and nearly fainted.   His wife took him home and he ate something.  However he still felt "hazy and tired."     Pt had a crown placed on a broken tooth yesterday,  he had received novocaine and took 600mg motrin.       He reports occas cramping discomfort in his back and chest right ribs.   He  denies SOB,  no n/v/ d,  no urinary complaints,  no edema/calf pain,  no URI complaints, no fever .  Pt drank 16 oz of coffee this AM and reports he does also drink soda with caffeine.     For a prior history of PVCs pt has seen  an EP doctor/ cardiologist from [place].       no further dizziness, at this time, no cp       PHYSICAL EXAM:    Daily     Daily Weight in k.7 (27 Sep 2024 21:38)    ICU Vital Signs Last 24 Hrs  T(C): 36.4 (28 Sep 2024 15:16), Max: 37 (27 Sep 2024 20:53)  T(F): 97.6 (28 Sep 2024 15:16), Max: 98.6 (27 Sep 2024 20:53)  HR: 69 (28 Sep 2024 07:50) (63 - 85)  BP: 140/89 (28 Sep 2024 07:50) (138/95 - 149/92)  BP(mean): 109 (27 Sep 2024 20:53) (109 - 109)  ABP: --  ABP(mean): --  RR: 18 (28 Sep 2024 15:16) (16 - 19)  SpO2: 96% (28 Sep 2024 15:16) (94% - 96%)    O2 Parameters below as of 28 Sep 2024 07:50  Patient On (Oxygen Delivery Method): room air            Constitutional: NAD  HEENT: Atraumatic, DEBBI, Normal, No congestion  Respiratory: Breath Sounds normal, no rhonchi/wheeze  Cardiovascular: N S1S2;  Gastrointestinal: Abdomen soft, non tender, Bowel Ssounds present  Extremities: No edema, peripheral pulses present  Neurological: AAO x 3, no gross focal motor deficits  Skin: Non cellulitic, no rash, ulcers                            14.2   6.55  )-----------( 191      ( 27 Sep 2024 14:27 )             43.5       CBC Full  -  ( 27 Sep 2024 14:27 )  WBC Count : 6.55 K/uL  RBC Count : 4.45 M/uL  Hemoglobin : 14.2 g/dL  Hematocrit : 43.5 %  Platelet Count - Automated : 191 K/uL  Mean Cell Volume : 97.8 fl  Mean Cell Hemoglobin : 31.9 pg  Mean Cell Hemoglobin Concentration : 32.6 gm/dL  Auto Neutrophil # : 3.99 K/uL  Auto Lymphocyte # : 1.46 K/uL  Auto Monocyte # : 0.70 K/uL  Auto Eosinophil # : 0.32 K/uL  Auto Basophil # : 0.06 K/uL  Auto Neutrophil % : 60.9 %  Auto Lymphocyte % : 22.3 %  Auto Monocyte % : 10.7 %  Auto Eosinophil % : 4.9 %  Auto Basophil % : 0.9 %          142  |  112[H]  |  14  ----------------------------<  110[H]  4.2   |  23  |  1.34[H]    Ca    9.0      28 Sep 2024 05:50    TPro  7.1  /  Alb  3.4  /  TBili  1.2  /  DBili  x   /  AST  19  /  ALT  28  /  AlkPhos  70        LIVER FUNCTIONS - ( 27 Sep 2024 14:27 )  Alb: 3.4 g/dL / Pro: 7.1 gm/dL / ALK PHOS: 70 U/L / ALT: 28 U/L / AST: 19 U/L / GGT: x             PT/INR - ( 27 Sep 2024 14:27 )   PT: 12.7 sec;   INR: 1.08 ratio         PTT - ( 27 Sep 2024 14:27 )  PTT:34.8 sec          Urinalysis Basic - ( 28 Sep 2024 05:50 )    Color: x / Appearance: x / SG: x / pH: x  Gluc: 110 mg/dL / Ketone: x  / Bili: x / Urobili: x   Blood: x / Protein: x / Nitrite: x   Leuk Esterase: x / RBC: x / WBC x   Sq Epi: x / Non Sq Epi: x / Bacteria: x            MEDICATIONS  (STANDING):  enoxaparin Injectable 40 milliGRAM(s) SubCutaneous every 24 hours  finasteride 5 milliGRAM(s) Oral at bedtime  fluticasone propionate/ salmeterol 250-50 MICROgram(s) Diskus 1 Dose(s) Inhalation two times a day  metoprolol tartrate 25 milliGRAM(s) Oral every 12 hours  tamsulosin 0.8 milliGRAM(s) Oral at bedtime

## 2024-09-28 NOTE — CONSULT NOTE ADULT - ASSESSMENT
Presyncope with prodrome in the setting of a low longstanding history of large volume PVCs with sleep apnea likely more severe than mild.   echocardiogram with wall motion abnormalities and mild drop in ejection fraction.  Further evaluation with cardiac catheterization and EP consultation for the possibility of an EP study verses implantable loop recorder for further evaluation of syncope.  Will need more evaluation for sleep apnea as probably a contributing factor as well.       case discussed with patient, family, ,  and staff

## 2024-09-28 NOTE — CONSULT NOTE ADULT - SUBJECTIVE AND OBJECTIVE BOX
HPI:  57 yo male with PMHx  recent Pneumonia ( Aug 2024) BPH, loose stools associated with IBS, Obesity , JANAY  currently using a oral appliance, and PVCs( extensive workup in past including multiple event monitors and CT angiogram of coronary arteries), who presented to [place] ED c/o 2 episodes of dizziness, diaphoresis and palpitations with chest pain/pressure 2/10.  Pt was walking from his office to the nearby pharmacy when he felt light headed and nearly fainted.   His wife took him home and he ate something.  However he still felt "hazy and tired."   Pt had a crown placed on a broken tooth yesterday,  he had received novocaine and took 600mg motrin. after going home he came back to the drugstore again to pick something up and had a 2nd event and that prompted the emergency room visit.  The 2nd event also had a prodrome and presyncopal.  He reports occas cramping discomfort in his back and chest right ribs.   He denies SOB,  no n/v/ d,  no urinary complaints,  no edema/calf pain,  no URI complaints, no fever.   patient was supposed to be evaluated for sleep apnea again because it was felt that it was worse but was not done due to insurance issues.  He has had multiple monitors with high volumes of PVCs with his never had further intervention than that for it.          PAST MEDICAL & SURGICAL HISTORY:  Frequent PVCs  JANAY (obstructive sleep apnea)  BPH (benign prostatic hyperplasia)  History of appendectomy    SOCIAL HISTORY: Non-Smoker/Social ETOH/ No Ilicit Drug use.    FAMILY HISTORY:  FHx: hypertension (Father, Mother)  Family history of ankylosing spondylitis (Father)    Allergies  sorbitol (Diarrhea)  dairy products (Diarrhea)  penicillin (Unknown)    Home Medications:  albuterol 90 mcg/inh inhalation aerosol: 2 puff(s) inhaled every 4 hours as needed for (27 Sep 2024 21:25)  alfuzosin 10 mg oral tablet, extended release: 1 tab(s) orally once a day (in the afternoon) (27 Sep 2024 21:29)  dutasteride 0.5 mg oral capsule: 1 cap(s) orally once a day (in the afternoon) (27 Sep 2024 21:28)  Motrin 600 mg oral tablet: 1 tab(s) orally 4 times a day as needed for (27 Sep 2024 21:29)  Symbicort 160 mcg-4.5 mcg/inh inhalation aerosol: 2 puff(s) inhaled 2 times a day as needed for (27 Sep 2024 21:29)      HOSPITAL MEDICATIONS:   MEDICATIONS  (STANDING):  aspirin enteric coated 81 milliGRAM(s) Oral daily  atorvastatin 40 milliGRAM(s) Oral at bedtime  enoxaparin Injectable 40 milliGRAM(s) SubCutaneous every 24 hours  finasteride 5 milliGRAM(s) Oral at bedtime  fluticasone propionate/ salmeterol 250-50 MICROgram(s) Diskus 1 Dose(s) Inhalation two times a day  metoprolol tartrate 25 milliGRAM(s) Oral every 12 hours  tamsulosin 0.8 milliGRAM(s) Oral at bedtime    MEDICATIONS  (PRN):  albuterol    90 MICROgram(s) HFA Inhaler 2 Puff(s) Inhalation every 6 hours PRN Shortness of Breath and/or Wheezing      REVIEW OF SYSTEMS: 13 systems were reviewed and all negative except for comments above.    Vital Signs Last 24 Hrs  T(C): 36.7 (28 Sep 2024 21:11), Max: 36.8 (28 Sep 2024 07:50)  T(F): 98 (28 Sep 2024 21:11), Max: 98.2 (28 Sep 2024 07:50)  HR: 78 (28 Sep 2024 21:11) (69 - 85)  BP: 120/79 (28 Sep 2024 21:11) (120/79 - 149/92)  BP(mean): --  RR: 18 (28 Sep 2024 21:11) (18 - 19)  SpO2: 93% (28 Sep 2024 21:11) (93% - 96%)    Parameters below as of 28 Sep 2024 21:11  Patient On (Oxygen Delivery Method): room air    Daily     Daily Weight in k.7 (27 Sep 2024 21:38)I&O's Summary      PHYSICAL EXAM:    Constitutional: NAD, awake and alert, well-developed  HEENT: PERRLA, EOMI,  No oral cyanosis. Oropharynx Clean and Dry.  Neck:  supple,  No JVD, No Thyroid enlargement. No Carotid Bruits bilaterally.  Respiratory: Breath sounds are clear bilaterally, No wheezing, rales or rhonchi  Cardiovascular: NL S1 and S2, RRR,   1/6 systolic ejection murmur, no gallops or rubs  Gastrointestinal: Bowel Sounds present.   Extremities: No peripheral edema. No clubbing or cyanosis.  Vascular: 2+ peripheral pulses in LE   Neurological: A/O x 3,  Musculoskeletal: no calf tenderness  Skin: No rashes or lessions.      LABS: All Labs Reviewed:                        14.2   6.55  )-----------( 191      ( 27 Sep 2024 14:27 )             43.5     28 Sep 2024 05:50    142    |  112    |  14     ----------------------------<  110    4.2     |  23     |  1.34   27 Sep 2024 14:27    140    |  110    |  18     ----------------------------<  148    3.9     |  23     |  1.42     Ca    9.0        28 Sep 2024 05:50  Ca    9.2        27 Sep 2024 14:27    TPro  7.1    /  Alb  3.4    /  TBili  1.2    /  DBili  x      /  AST  19     /  ALT  28     /  AlkPhos  70     27 Sep 2024 14:27    PT/INR - ( 27 Sep 2024 14:27 )   PT: 12.7 sec;   INR: 1.08 ratio         PTT - ( 27 Sep 2024 14:27 )  PTT:34.8    - Troponin: <-8.79, <-11.26  RADIOLOGY:    < from: CT Angio Chest PE Protocol w/ IV Cont (24 @ 17:05) >  IMPRESSION:  No acute pulmonary embolism    Trace bilateral pleural effusions with adjacent atelectasis    < end of copied text >  < from: Xray Chest 1 View- PORTABLE-Urgent (24 @ 15:36) >    IMPRESSION: Clear lungs at this time.    < end of copied text >    EKG:    ECHO:    CARDIAC CATHTERIZATION/STRESS TEST:   HPI:  57 yo male with PMHx  recent Pneumonia ( Aug 2024) BPH, loose stools associated with IBS, Obesity , JANAY  currently using a oral appliance, and PVCs( extensive workup in past including multiple event monitors and CT angiogram of coronary arteries), who presented to [place] ED c/o 2 episodes of dizziness, diaphoresis and palpitations with chest pain/pressure 2/10.  Pt was walking from his office to the nearby pharmacy when he felt light headed and nearly fainted.   His wife took him home and he ate something.  However he still felt "hazy and tired."   Pt had a crown placed on a broken tooth yesterday,  he had received novocaine and took 600mg motrin. after going home he came back to the drugstore again to pick something up and had a 2nd event and that prompted the emergency room visit.  The 2nd event also had a prodrome and presyncopal.  He reports occas cramping discomfort in his back and chest right ribs.   He denies SOB,  no n/v/ d,  no urinary complaints,  no edema/calf pain,  no URI complaints, no fever.   patient was supposed to be evaluated for sleep apnea again because it was felt that it was worse but was not done due to insurance issues.  He has had multiple monitors with high volumes of PVCs with his never had further intervention than that for it.          PAST MEDICAL & SURGICAL HISTORY:  Frequent PVCs  JANAY (obstructive sleep apnea)  BPH (benign prostatic hyperplasia)  History of appendectomy    SOCIAL HISTORY: Non-Smoker/Social ETOH/ No Ilicit Drug use.    FAMILY HISTORY:  FHx: hypertension (Father, Mother)  Family history of ankylosing spondylitis (Father)    Allergies  sorbitol (Diarrhea)  dairy products (Diarrhea)  penicillin (Unknown)    Home Medications:  albuterol 90 mcg/inh inhalation aerosol: 2 puff(s) inhaled every 4 hours as needed for (27 Sep 2024 21:25)  alfuzosin 10 mg oral tablet, extended release: 1 tab(s) orally once a day (in the afternoon) (27 Sep 2024 21:29)  dutasteride 0.5 mg oral capsule: 1 cap(s) orally once a day (in the afternoon) (27 Sep 2024 21:28)  Motrin 600 mg oral tablet: 1 tab(s) orally 4 times a day as needed for (27 Sep 2024 21:29)  Symbicort 160 mcg-4.5 mcg/inh inhalation aerosol: 2 puff(s) inhaled 2 times a day as needed for (27 Sep 2024 21:29)      HOSPITAL MEDICATIONS:   MEDICATIONS  (STANDING):  aspirin enteric coated 81 milliGRAM(s) Oral daily  atorvastatin 40 milliGRAM(s) Oral at bedtime  enoxaparin Injectable 40 milliGRAM(s) SubCutaneous every 24 hours  finasteride 5 milliGRAM(s) Oral at bedtime  fluticasone propionate/ salmeterol 250-50 MICROgram(s) Diskus 1 Dose(s) Inhalation two times a day  metoprolol tartrate 25 milliGRAM(s) Oral every 12 hours  tamsulosin 0.8 milliGRAM(s) Oral at bedtime    MEDICATIONS  (PRN):  albuterol    90 MICROgram(s) HFA Inhaler 2 Puff(s) Inhalation every 6 hours PRN Shortness of Breath and/or Wheezing      REVIEW OF SYSTEMS: 13 systems were reviewed and all negative except for comments above.    Vital Signs Last 24 Hrs  T(C): 36.7 (28 Sep 2024 21:11), Max: 36.8 (28 Sep 2024 07:50)  T(F): 98 (28 Sep 2024 21:11), Max: 98.2 (28 Sep 2024 07:50)  HR: 78 (28 Sep 2024 21:11) (69 - 85)  BP: 120/79 (28 Sep 2024 21:11) (120/79 - 149/92)  BP(mean): --  RR: 18 (28 Sep 2024 21:11) (18 - 19)  SpO2: 93% (28 Sep 2024 21:11) (93% - 96%)    Parameters below as of 28 Sep 2024 21:11  Patient On (Oxygen Delivery Method): room air    Daily     Daily Weight in k.7 (27 Sep 2024 21:38)I&O's Summary      PHYSICAL EXAM:    Constitutional: NAD, awake and alert, well-developed  HEENT: PERRLA, EOMI,  No oral cyanosis. Oropharynx Clean and Dry.  Neck:  supple,  No JVD, No Thyroid enlargement. No Carotid Bruits bilaterally.  Respiratory: Breath sounds are clear bilaterally, No wheezing, rales or rhonchi  Cardiovascular: NL S1 and S2, RRR,   1/6 systolic ejection murmur, no gallops or rubs  Gastrointestinal: Bowel Sounds present.   Extremities: No peripheral edema. No clubbing or cyanosis.  Vascular: 2+ peripheral pulses in LE   Neurological: A/O x 3,  Musculoskeletal: no calf tenderness  Skin: No rashes or lessions.      LABS: All Labs Reviewed:                        14.2   6.55  )-----------( 191      ( 27 Sep 2024 14:27 )             43.5     28 Sep 2024 05:50    142    |  112    |  14     ----------------------------<  110    4.2     |  23     |  1.34   27 Sep 2024 14:27    140    |  110    |  18     ----------------------------<  148    3.9     |  23     |  1.42     Ca    9.0        28 Sep 2024 05:50  Ca    9.2        27 Sep 2024 14:27    TPro  7.1    /  Alb  3.4    /  TBili  1.2    /  DBili  x      /  AST  19     /  ALT  28     /  AlkPhos  70     27 Sep 2024 14:27    PT/INR - ( 27 Sep 2024 14:27 )   PT: 12.7 sec;   INR: 1.08 ratio         PTT - ( 27 Sep 2024 14:27 )  PTT:34.8    - Troponin: <-8.79, <-11.26  RADIOLOGY:    < from: CT Angio Chest PE Protocol w/ IV Cont (24 @ 17:05) >  IMPRESSION:  No acute pulmonary embolism    Trace bilateral pleural effusions with adjacent atelectasis    < end of copied text >  < from: Xray Chest 1 View- PORTABLE-Urgent (24 @ 15:36) >    IMPRESSION: Clear lungs at this time.    < end of copied text >    EKG:    < from: 12 Lead ECG (24 @ 14:17) >   Sinus rhythm with frequent and consecutive Premature ventricular complexes  Possible Inferior infarct , age undetermined  Abnormal ECG    < end of copied text >  ECHO:    < from: TTE W or WO Ultrasound Enhancing Agent (24 @ 08:14) >  1. Left ventricular cavity is normal in size. Left ventricular systolic function is low normal with an ejection fraction visually estimated at 50 to 55 %. Regional wall motion abnormalities present.   2. Mild left ventricular hypertrophy.   3. Basal and midanterolateral wall, basal inferoseptal segment, and apical lateral segment are abnormal. However, accuracy is limited due to frequent PVC's.   4. Basal and mid anterolateral wall, basal inferoseptal segment, and apical lateral segment are abnormal.   5. Normal left ventricular diastolic function.   6. Left atrium is normal in size.   7. The right atrium is normal in size.   8. Normal right ventricular cavity size, with normal wall thickness, and normal right ventricular systolic function.   9. Structurally normal mitral valve with normal leaflet excursion.  10. Structurally normal tricuspid valve with normal leaflet excursion.  11. Trileaflet aortic valve with normal systolic excursion.  12. The interatrial septum appears intact.  13. No pericardial effusion seen.    < end of copied text >      CT angiogram of coronaries 2023:  Mild coronary disease in all vessels.

## 2024-09-28 NOTE — PROGRESS NOTE ADULT - ASSESSMENT
Pt is admitted w/      Presyncope  Light headedness  chest pressure  Freq PVCs  Arrythmia  abn EKG  Hyperglycemia  MARIBETH vs CKD  Recent dental crown placed yesterday due to broken tooth    Hx of Pna , 1 month ago  Hx of  BPH  Hx of JANAY untreated  Hx of  PVCs  Hx of Obesity    - s/p ASA 81mg x4  - s/p 1000 ml NS IVF in the ED  - admit to Telemetry monitoring   - will add metoprolol 25mg po every12 hours  - CTA is neg for PE, shows trace bilat pl eff  - echocardiogram  - continue home meds of dutasteride, alfuzosin  - cardiology consult requested w/ / Bonilla /Jp/Catarino  - chpeat labs  - outpt f/u with Pulmonary for untreated JANAY  - DVT proph on lovenox  - Full Code    pending cardio eval    total time spent 55mins   echo abnormal wall motion, NL LVEF Pt is admitted w/      Presyncope  Light headedness  chest pressure  Freq PVCs  Arrythmia  abn EKG  Hyperglycemia  MARIBETH vs CKD  Recent dental crown placed yesterday due to broken tooth    Hx of Pna , 1 month ago  Hx of  BPH  Hx of JANAY untreated  Hx of  PVCs  Hx of Obesity    - s/p ASA 81mg x4  - s/p 1000 ml NS IVF in the ED  - admit to Telemetry monitoring   - will add metoprolol 25mg po every12 hours  - CTA is neg for PE, shows trace bilat pl eff  - echocardiogram  - continue home meds of dutasteride, alfuzosin  - cardiology consult requested w/ / Bonilla /Jp/Catarino  - chpeat labs  - outpt f/u with Pulmonary for untreated JANAY  - DVT proph on lovenox  - Full Code    pending cardio eval    total time spent 55mins   echo abnormal wall motion, NL LVEF  plan for cath and EP study on monday - dw dr valdovinos and patient

## 2024-09-29 LAB
A1C WITH ESTIMATED AVERAGE GLUCOSE RESULT: 6.1 % — HIGH (ref 4–5.6)
ANION GAP SERPL CALC-SCNC: 4 MMOL/L — LOW (ref 5–17)
BASOPHILS # BLD AUTO: 0.07 K/UL — SIGNIFICANT CHANGE UP (ref 0–0.2)
BASOPHILS NFR BLD AUTO: 0.9 % — SIGNIFICANT CHANGE UP (ref 0–2)
BUN SERPL-MCNC: 20 MG/DL — SIGNIFICANT CHANGE UP (ref 7–23)
CALCIUM SERPL-MCNC: 9.2 MG/DL — SIGNIFICANT CHANGE UP (ref 8.5–10.1)
CHLORIDE SERPL-SCNC: 109 MMOL/L — HIGH (ref 96–108)
CO2 SERPL-SCNC: 27 MMOL/L — SIGNIFICANT CHANGE UP (ref 22–31)
CREAT SERPL-MCNC: 1.47 MG/DL — HIGH (ref 0.5–1.3)
EGFR: 56 ML/MIN/1.73M2 — LOW
EOSINOPHIL # BLD AUTO: 0.36 K/UL — SIGNIFICANT CHANGE UP (ref 0–0.5)
EOSINOPHIL NFR BLD AUTO: 4.8 % — SIGNIFICANT CHANGE UP (ref 0–6)
ESTIMATED AVERAGE GLUCOSE: 128 MG/DL — HIGH (ref 68–114)
GLUCOSE SERPL-MCNC: 113 MG/DL — HIGH (ref 70–99)
HCT VFR BLD CALC: 46.9 % — SIGNIFICANT CHANGE UP (ref 39–50)
HGB BLD-MCNC: 15.2 G/DL — SIGNIFICANT CHANGE UP (ref 13–17)
IMM GRANULOCYTES NFR BLD AUTO: 0.4 % — SIGNIFICANT CHANGE UP (ref 0–0.9)
LYMPHOCYTES # BLD AUTO: 1.63 K/UL — SIGNIFICANT CHANGE UP (ref 1–3.3)
LYMPHOCYTES # BLD AUTO: 21.8 % — SIGNIFICANT CHANGE UP (ref 13–44)
MCHC RBC-ENTMCNC: 32.1 PG — SIGNIFICANT CHANGE UP (ref 27–34)
MCHC RBC-ENTMCNC: 32.4 GM/DL — SIGNIFICANT CHANGE UP (ref 32–36)
MCV RBC AUTO: 99.2 FL — SIGNIFICANT CHANGE UP (ref 80–100)
MONOCYTES # BLD AUTO: 0.77 K/UL — SIGNIFICANT CHANGE UP (ref 0–0.9)
MONOCYTES NFR BLD AUTO: 10.3 % — SIGNIFICANT CHANGE UP (ref 2–14)
NEUTROPHILS # BLD AUTO: 4.63 K/UL — SIGNIFICANT CHANGE UP (ref 1.8–7.4)
NEUTROPHILS NFR BLD AUTO: 61.8 % — SIGNIFICANT CHANGE UP (ref 43–77)
PLATELET # BLD AUTO: 202 K/UL — SIGNIFICANT CHANGE UP (ref 150–400)
POTASSIUM SERPL-MCNC: 4.2 MMOL/L — SIGNIFICANT CHANGE UP (ref 3.5–5.3)
POTASSIUM SERPL-SCNC: 4.2 MMOL/L — SIGNIFICANT CHANGE UP (ref 3.5–5.3)
RBC # BLD: 4.73 M/UL — SIGNIFICANT CHANGE UP (ref 4.2–5.8)
RBC # FLD: 12.6 % — SIGNIFICANT CHANGE UP (ref 10.3–14.5)
SODIUM SERPL-SCNC: 140 MMOL/L — SIGNIFICANT CHANGE UP (ref 135–145)
WBC # BLD: 7.49 K/UL — SIGNIFICANT CHANGE UP (ref 3.8–10.5)
WBC # FLD AUTO: 7.49 K/UL — SIGNIFICANT CHANGE UP (ref 3.8–10.5)

## 2024-09-29 PROCEDURE — 99223 1ST HOSP IP/OBS HIGH 75: CPT

## 2024-09-29 PROCEDURE — 99232 SBSQ HOSP IP/OBS MODERATE 35: CPT

## 2024-09-29 RX ORDER — SODIUM CHLORIDE 0.9 % (FLUSH) 0.9 %
1000 SYRINGE (ML) INJECTION
Refills: 0 | Status: DISCONTINUED | OUTPATIENT
Start: 2024-09-30 | End: 2024-10-01

## 2024-09-29 RX ADMIN — Medication 0.8 MILLIGRAM(S): at 22:04

## 2024-09-29 RX ADMIN — Medication 1 DOSE(S): at 08:00

## 2024-09-29 RX ADMIN — FINASTERIDE 5 MILLIGRAM(S): 5 TABLET, FILM COATED ORAL at 22:04

## 2024-09-29 RX ADMIN — ATORVASTATIN CALCIUM 40 MILLIGRAM(S): 10 TABLET, FILM COATED ORAL at 22:04

## 2024-09-29 RX ADMIN — Medication 81 MILLIGRAM(S): at 09:37

## 2024-09-29 RX ADMIN — Medication 1 DOSE(S): at 19:50

## 2024-09-29 RX ADMIN — ENOXAPARIN SODIUM 40 MILLIGRAM(S): 150 INJECTION SUBCUTANEOUS at 09:37

## 2024-09-29 RX ADMIN — Medication 25 MILLIGRAM(S): at 22:04

## 2024-09-29 RX ADMIN — Medication 25 MILLIGRAM(S): at 09:37

## 2024-09-29 NOTE — CONSULT NOTE ADULT - SUBJECTIVE AND OBJECTIVE BOX
Patient is a 58y old  Male who presents with a chief complaint of Presyncope, Light headedness, chest pressure, Freq PVCs       HPI:  Pt is a pleasant 58 year old man with recent Pneumonia ( Aug 2024) BPH, loose stools associated with IBS,  Obesity , untreated JANAY and PVCs,  who presented to [place] ED c/o 2 episodes of dizziness, diaphoresis and palpitations with chest pain/pressure 2/10.  His symptoms  occured  at 12: 30 pm today.  Pt was walking from his office to the nearby pharmacy when he felt light headed and nearly fainted.   His wife took him home and he ate something.  However he still felt "hazy and tired."     Pt had a crown placed on a broken tooth yesterday,  he had received novocaine and took 600mg motrin.    He reports occas cramping discomfort in his back and chest right ribs.   He  denies SOB,  no n/v/ d,  no urinary complaints,  no edema/calf pain,  no URI complaints, no fever .  Pt drank 16 oz of coffee this AM and reports he does also drink soda with caffeine.     For a prior history of PVCs pt has seen  an EP doctor/ cardiologist from [place] ( Dr. NARENDRA Ayala) .       PAST MEDICAL & SURGICAL HISTORY:  Frequent PVCs  JANAY (obstructive sleep apnea)  BPH (benign prostatic hyperplasia)  History of appendectomy            MEDICATIONS  (STANDING):  aspirin enteric coated 81 milliGRAM(s) Oral daily  atorvastatin 40 milliGRAM(s) Oral at bedtime  enoxaparin Injectable 40 milliGRAM(s) SubCutaneous every 24 hours  finasteride 5 milliGRAM(s) Oral at bedtime  fluticasone propionate/ salmeterol 250-50 MICROgram(s) Diskus 1 Dose(s) Inhalation two times a day  metoprolol tartrate 25 milliGRAM(s) Oral every 12 hours  tamsulosin 0.8 milliGRAM(s) Oral at bedtime    MEDICATIONS  (PRN):  albuterol    90 MICROgram(s) HFA Inhaler 2 Puff(s) Inhalation every 6 hours PRN Shortness of Breath and/or Wheezing      FAMILY HISTORY:  FHx: hypertension (Father, Mother)    Family history of ankylosing spondylitis (Father)        SOCIAL HISTORY:   No tobacco. .     ROS:     A comprehensive review of systems was performed and pertinent items are noted in the history above.       Vital Signs Last 24 Hrs  T(C): 36.3 (29 Sep 2024 08:40), Max: 36.7 (28 Sep 2024 21:11)  T(F): 97.3 (29 Sep 2024 08:40), Max: 98 (28 Sep 2024 21:11)  HR: 64 (29 Sep 2024 08:40) (64 - 84)  BP: 131/81 (29 Sep 2024 08:40) (120/79 - 131/81)  BP(mean): --  RR: 18 (29 Sep 2024 08:40) (18 - 18)  SpO2: 97% (29 Sep 2024 08:40) (93% - 97%)    Parameters below as of 29 Sep 2024 08:40  Patient On (Oxygen Delivery Method): room air        INTERPRETATION OF TELEMETRY: Sinus PVCs      EC/28 :Sinus 61 bpm, Low voltage limb leads.    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       LABS:                          15.2   7.49  )-----------( 202      ( 29 Sep 2024 06:46 )             46.9     29    140  |  109[H]  |  20  ----------------------------<  113[H]  4.2   |  27  |  1.47[H]    Ca    9.2      29 Sep 2024 06:46    TPro  7.1  /  Alb  3.4  /  TBili  1.2  /  DBili  x   /  AST  19  /  ALT  28  /  AlkPhos  70  27        Lipid Panel  228  39  --  160    Pro BNP  --  @ 14:27  D Dimer  688  @ 14:27    PT/INR - ( 27 Sep 2024 14:27 )   PT: 12.7 sec;   INR: 1.08 ratio         PTT - ( 27 Sep 2024 14:27 )  PTT:34.8 sec  Urinalysis Basic - ( 29 Sep 2024 06:46 )    Color: x / Appearance: x / SG: x / pH: x  Gluc: 113 mg/dL / Ketone: x  / Bili: x / Urobili: x   Blood: x / Protein: x / Nitrite: x   Leuk Esterase: x / RBC: x / WBC x   Sq Epi: x / Non Sq Epi: x / Bacteria: x        RADIOLOGY & ADDITIONAL STUDIES:      PREVIOUS DIAGNOSTIC TESTING:      ECHO  FINDINGS:    < from: TTE W or WO Ultrasound Enhancing Agent (24 @ 08:14) >  CONCLUSIONS:      1. Left ventricular cavity is normal in size. Left ventricular systolic function is low normal with an ejection fraction visually estimated at 50 to 55 %. Regional wall motion abnormalities present.   2. Mild left ventricular hypertrophy.   3. Basal and midanterolateral wall, basal inferoseptal segment, and apical lateral segment are abnormal. However, accuracy is limited due to frequent PVC's.   4. Basal and mid anterolateral wall, basal inferoseptal segment, and apical lateral segment are abnormal.   5. Normal left ventricular diastolic function.   6. Left atrium is normal in size.   7. The right atrium is normal in size.   8. Normal right ventricular cavity size, with normal wall thickness, and normal right ventricular systolic function.   9. Structurally normal mitral valve with normal leaflet excursion.  10. Structurally normal tricuspid valve with normal leaflet excursion.  11. Trileaflet aortic valve with normal systolic excursion.  12. The interatrial septum appears intact.  13. No pericardial effusion seen.      < end of copied text >

## 2024-09-29 NOTE — PROGRESS NOTE ADULT - SUBJECTIVE AND OBJECTIVE BOX
59 yo male with PMHx  recent Pneumonia ( Aug 2024) BPH, loose stools associated with IBS,  Obesity , untreated JANAY and PVCs,  who presented to [place] ED c/o 2 episodes of dizziness, diaphoresis and palpitations with chest pain/pressure 2/10.  His symptoms  occured  at 12: 30 pm today.  Pt was walking from his office to the nearby pharmacy when he felt light headed and nearly fainted.   His wife took him home and he ate something.  However he still felt "hazy and tired."     Pt had a crown placed on a broken tooth yesterday,  he had received novocaine and took 600mg motrin.       He reports occas cramping discomfort in his back and chest right ribs.   He  denies SOB,  no n/v/ d,  no urinary complaints,  no edema/calf pain,  no URI complaints, no fever .  Pt drank 16 oz of coffee this AM and reports he does also drink soda with caffeine.     For a prior history of PVCs pt has seen  an EP doctor/ cardiologist from [place].      9/29 no further dizziness, at this time, no cp  no sob , reports being comfortable      PHYSICAL EXAM:Constitutional: NAD  HEENT: Atraumatic, DEBBI, Normal, No congestion  Respiratory: Breath Sounds normal, no rhonchi/wheeze  Cardiovascular: N S1S2;  Gastrointestinal: Abdomen soft, non tender, Bowel Ssounds present  Extremities: No edema, peripheral pulses present  Neurological: AAO x 3, no gross focal motor deficits  Skin: Non cellulitic, no rash, ulcers      PHYSICAL EXAM:    Daily     Daily     ICU Vital Signs Last 24 Hrs  T(C): 36.3 (29 Sep 2024 08:40), Max: 36.7 (28 Sep 2024 21:11)  T(F): 97.3 (29 Sep 2024 08:40), Max: 98 (28 Sep 2024 21:11)  HR: 64 (29 Sep 2024 08:40) (64 - 84)  BP: 131/81 (29 Sep 2024 08:40) (120/79 - 131/81)  BP(mean): --  ABP: --  ABP(mean): --  RR: 18 (29 Sep 2024 08:40) (18 - 18)  SpO2: 97% (29 Sep 2024 08:40) (93% - 97%)    O2 Parameters below as of 29 Sep 2024 08:40  Patient On (Oxygen Delivery Method): room air                                  15.2   7.49  )-----------( 202      ( 29 Sep 2024 06:46 )             46.9       CBC Full  -  ( 29 Sep 2024 06:46 )  WBC Count : 7.49 K/uL  RBC Count : 4.73 M/uL  Hemoglobin : 15.2 g/dL  Hematocrit : 46.9 %  Platelet Count - Automated : 202 K/uL  Mean Cell Volume : 99.2 fl  Mean Cell Hemoglobin : 32.1 pg  Mean Cell Hemoglobin Concentration : 32.4 gm/dL  Auto Neutrophil # : 4.63 K/uL  Auto Lymphocyte # : 1.63 K/uL  Auto Monocyte # : 0.77 K/uL  Auto Eosinophil # : 0.36 K/uL  Auto Basophil # : 0.07 K/uL  Auto Neutrophil % : 61.8 %  Auto Lymphocyte % : 21.8 %  Auto Monocyte % : 10.3 %  Auto Eosinophil % : 4.8 %  Auto Basophil % : 0.9 %      09-29    140  |  109[H]  |  20  ----------------------------<  113[H]  4.2   |  27  |  1.47[H]    Ca    9.2      29 Sep 2024 06:46    TPro  7.1  /  Alb  3.4  /  TBili  1.2  /  DBili  x   /  AST  19  /  ALT  28  /  AlkPhos  70  09-27      LIVER FUNCTIONS - ( 27 Sep 2024 14:27 )  Alb: 3.4 g/dL / Pro: 7.1 gm/dL / ALK PHOS: 70 U/L / ALT: 28 U/L / AST: 19 U/L / GGT: x             PT/INR - ( 27 Sep 2024 14:27 )   PT: 12.7 sec;   INR: 1.08 ratio         PTT - ( 27 Sep 2024 14:27 )  PTT:34.8 sec          Urinalysis Basic - ( 29 Sep 2024 06:46 )    Color: x / Appearance: x / SG: x / pH: x  Gluc: 113 mg/dL / Ketone: x  / Bili: x / Urobili: x   Blood: x / Protein: x / Nitrite: x   Leuk Esterase: x / RBC: x / WBC x   Sq Epi: x / Non Sq Epi: x / Bacteria: x            MEDICATIONS  (STANDING):  aspirin enteric coated 81 milliGRAM(s) Oral daily  atorvastatin 40 milliGRAM(s) Oral at bedtime  enoxaparin Injectable 40 milliGRAM(s) SubCutaneous every 24 hours  finasteride 5 milliGRAM(s) Oral at bedtime  fluticasone propionate/ salmeterol 250-50 MICROgram(s) Diskus 1 Dose(s) Inhalation two times a day  metoprolol tartrate 25 milliGRAM(s) Oral every 12 hours  tamsulosin 0.8 milliGRAM(s) Oral at bedtime

## 2024-09-29 NOTE — CONSULT NOTE ADULT - ASSESSMENT
This is a 58-year-old gentleman with history of PVCs, sleep apnea, a recent CT angio with mild coronary disease who presents with an episode of near syncope.  This was 1 of 2 episodes that occurred on the day of admission both occurred in a standing position with a prodrome of lightheadedness dizziness and chest discomfort.  He denies any palpitations or tachycardia.  She did not lose consciousness and maintain postural tone.      Presyncope of unclear origin ECG on presentation demonstrated frequent RVOT morphology PVCs.    He is scheduled for cardiac catheterization in the morning, and we will plan further evaluation pending the outcome of that study will likely recommend a implantable cardiac monitor or loop recorder for long-term monitoring.  Would not consider an EP study without a planned catheter ablation and the patient does not wish to proceed with ablation at this time.   This is a 58-year-old gentleman with history of PVCs, sleep apnea, a recent CT angio with mild coronary disease who presents with an episode of near syncope.  This was 1 of 2 episodes that occurred on the day of admission both occurred in a standing position with a prodrome of lightheadedness dizziness and chest discomfort.  He denies any palpitations or tachycardia.  She did not lose consciousness and maintain postural tone. Clinical history sounds like a vasovagal origin of his symptoms.      Presyncope of unclear origin ECG on presentation demonstrated frequent RVOT morphology PVCs.    He is scheduled for cardiac catheterization in the morning, and we will plan further evaluation pending the outcome of that study will likely recommend a implantable cardiac monitor or loop recorder for long-term monitoring.  Would not consider an EP study without a planned catheter ablation and the patient does not wish to proceed with ablation at this time.

## 2024-09-29 NOTE — PROGRESS NOTE ADULT - ASSESSMENT
Presyncope with prodrome in the setting of a low longstanding history of large volume PVCs with sleep apnea likely more severe than mild.   echocardiogram with wall motion abnormalities and mild drop in ejection fraction.  Further evaluation with cardiac catheterization and EP consultation for the possibility of an EP study verses implantable loop recorder for further evaluation of syncope.  Will need more evaluation for sleep apnea as probably a contributing factor as well.        9/29/2024:  Cardiac catheterization scheduled for further evaluation for ischemic heart disease.  We will start hydration later today in preparation for procedure tomorrow.  EP consult appreciated.  Further recommendations based on results of catheterization.    case discussed with patient, family, ,  and staff

## 2024-09-29 NOTE — PROGRESS NOTE ADULT - ASSESSMENT
{\rtf1\cepttf18392\ansi\qktveom0258\ftnbj\uc1\deff0  {\fonttbl{\f0 \fnil Segoe UI;}{\f1 \fnil \fcharset0 Segoe UI;}{\f2 \fnil Times New Soy;}}  {\colortbl ;\riy894\cvfrd564\dmqj312 ;\red0\green0\blue0 ;\red0\green0\blue0 ;}  {\stylesheet{\f0\fs20 Normal;}{\cs1 Default Paragraph Font;}{\cs2\f0\fs16 Line Number;}{\cs3\f2\fs24 Hyperlink;}}  {\*\revtbl{Unknown;}}  \bdkokv10592\lqksdz41301\ohhbn1854\twawe2578\sjcrk0280\jrpsi0479\nfggica198\kliiaib503\nogrowautofit\ligryn197\formshade\nofeaturethrottle1\dntblnsbdb\fet4\aendnotes\aftnnrlc\pgbrdrhead\pgbrdrfoot  \sectd\wzcnjk36248\bisksv18087\guttersxn0\rzngyene4107\wfrihdcu8182\rsrzpavp0175\qttcfuih6409\rukctks404\pcyqwik547\sbkpage\pgncont\pgndec  \plain\plain\f0\fs24\ql\plain\f0\fs24\plain\f1\fs16\ennd1166\hich\f1\dbch\f1\loch\f1\cf2\fs16 Pt is admitted w/\par  \par  \par  \plain\f1\fs16\zckj4333\hich\f1\dbch\f1\loch\f1\cf2\fs16\b Presyncope\par  Light headedness\par  chest pressure\par  Freq PVCs\par  Arrythmia\par  abn EKG\par  Hyperglycemia\par  MARIBETH vs CKD\par  Recent dental crown placed yesterday due to broken tooth\par  \par  Hx of Pna , 1 month ago\par  Hx of  BPH\par  Hx of JANAY untreated\par  Hx of  PVCs\par  Hx of Obesity\plain\f1\fs16\bolx8854\hich\f1\dbch\f1\loch\f1\cf2\fs16\par  \par  - s/p ASA 81mg x4\par  - s/p 1000 ml NS IVF in the ED\par  - admit to Telemetry monitoring \par  - will add metoprolol 25mg po every12 hours\par  - CTA is neg for PE, shows trace bilat pl eff\par  \par  - continue home meds of dutasteride, alfuzosin\par  - cardiology consult requested w/ / Bonilla /Jp/Catarino\par  - chpeat labs\par  - outpt f/u with Pulmonary for untreated JANAY\par  - DVT proph on lovenox\par  - Full Code  \par  \par  \par   echo abnormal wall motion, NL LVEF\par  \plain\f1\fs16\ewil1819\hich\f1\dbch\f1\loch\f1\cf2\fs16\b plan for cath monday  and per EP likely ILR , no EP study at this time as patient does not wish for planned catheter ablation\plain\f1\fs16\ogiz7539\hich\f1\dbch\f1\loch\f1\cf2\fs16\par  \plain\f1\fs16\smgr3640\hich\f1\dbch\f1\loch\f1\cf2\fs16\strike\plain\f1\fs16\qeaa1034\hich\f1\dbch\f1\loch\f1\cf2\fs16\plain\f1\fs16\iwpd9196\hich\f1\dbch\f1\loch\f1\cf2\fs16\par  \plain\f0\fs20\clrq6769\hich\f0\dbch\f0\loch\f0\fs20\par  }

## 2024-09-29 NOTE — CONSULT NOTE ADULT - PSYCHIATRIC
"Patient was angry and frustrated that she was unable to fall asleep due to according to her, \" my roommate that won't freaking shut up, and they keep on moving the chair upstairs.\" \" Can I just kill someone and get put away or do you have a solitarium that I can go to be by myself?\". Then patient got up from bed and stated that she needed to go to the BR, ripped the oximeter probe off her finger, forcefully dragged the iv pole to the BR and slammed the door. When patient came out of the BR a new probe was going to be placed on her finger and patient stated, \" patient's right to refuse, so I refuse that and you can spot check me\".  Patient already had ear plug but said that the do not keep off the noise. Complained of headache and declined tylenol and was given oxycodone as well as ice packs.  " normal/normal affect/alert and oriented x3/normal behavior

## 2024-09-29 NOTE — CONSULT NOTE ADULT - REASON FOR ADMISSION
Presyncope  Light headedness  chest pressure  Freq PVCs
Presyncope  Light headedness  chest pressure  Freq PVCs

## 2024-09-29 NOTE — PROGRESS NOTE ADULT - SUBJECTIVE AND OBJECTIVE BOX
HPI:  59 yo male with PMHx  recent Pneumonia ( Aug 2024) BPH, loose stools associated with IBS, Obesity , JANAY  currently using a oral appliance, and PVCs( extensive workup in past including multiple event monitors and CT angiogram of coronary arteries), who presented to [facility] c/o 2 episodes of dizziness, diaphoresis and palpitations with chest pain/pressure 2/10.  Pt was walking from his office to the nearby pharmacy when he felt light headed and nearly fainted.   His wife took him home and he ate something.  However he still felt "hazy and tired."   Pt had a crown placed on a broken tooth yesterday,  he had received novocaine and took 600mg motrin. after going home he came back to the drugstore again to pick something up and had a 2nd event and that prompted the emergency room visit.  The 2nd event also had a prodrome and presyncopal.  He reports occas cramping discomfort in his back and chest right ribs.   He denies SOB,  no n/v/ d,  no urinary complaints,  no edema/calf pain,  no URI complaints, no fever.   patient was supposed to be evaluated for sleep apnea again because it was felt that it was worse but was not done due to insurance issues.  He has had multiple monitors with high volumes of PVCs with his never had further intervention than that for it.      9/29/2024:  No further chest pain.  No recurrence of syncope.  Telemetry shows VPCs and bigeminy but no sustained arrhythmias.  EP consult appreciated.  Cardiac catheterization scheduled for tomorrow for further evaluation for ischemic heart disease.        PAST MEDICAL & SURGICAL HISTORY:  Frequent PVCs  JANAY (obstructive sleep apnea)  BPH (benign prostatic hyperplasia)  History of appendectomy    SOCIAL HISTORY: Non-Smoker/Social ETOH/ No Ilicit Drug use.    FAMILY HISTORY:  FHx: hypertension (Father, Mother)  Family history of ankylosing spondylitis (Father)    Allergies  sorbitol (Diarrhea)  dairy products (Diarrhea)  penicillin (Unknown)    Home Medications:  albuterol 90 mcg/inh inhalation aerosol: 2 puff(s) inhaled every 4 hours as needed for (27 Sep 2024 21:25)  alfuzosin 10 mg oral tablet, extended release: 1 tab(s) orally once a day (in the afternoon) (27 Sep 2024 21:29)  dutasteride 0.5 mg oral capsule: 1 cap(s) orally once a day (in the afternoon) (27 Sep 2024 21:28)  Motrin 600 mg oral tablet: 1 tab(s) orally 4 times a day as needed for (27 Sep 2024 21:29)  Symbicort 160 mcg-4.5 mcg/inh inhalation aerosol: 2 puff(s) inhaled 2 times a day as needed for (27 Sep 2024 21:29)    MEDICATIONS  (STANDING):  aspirin enteric coated 81 milliGRAM(s) Oral daily  atorvastatin 40 milliGRAM(s) Oral at bedtime  enoxaparin Injectable 40 milliGRAM(s) SubCutaneous every 24 hours  finasteride 5 milliGRAM(s) Oral at bedtime  fluticasone propionate/ salmeterol 250-50 MICROgram(s) Diskus 1 Dose(s) Inhalation two times a day  metoprolol tartrate 25 milliGRAM(s) Oral every 12 hours  tamsulosin 0.8 milliGRAM(s) Oral at bedtime    MEDICATIONS  (PRN):  albuterol    90 MICROgram(s) HFA Inhaler 2 Puff(s) Inhalation every 6 hours PRN Shortness of Breath and/or Wheezing      Vital Signs Last 24 Hrs  T(C): 36.3 (29 Sep 2024 08:40), Max: 36.7 (28 Sep 2024 21:11)  T(F): 97.3 (29 Sep 2024 08:40), Max: 98 (28 Sep 2024 21:11)  HR: 64 (29 Sep 2024 08:40) (64 - 84)  BP: 131/81 (29 Sep 2024 08:40) (120/79 - 131/81)  BP(mean): --  RR: 18 (29 Sep 2024 08:40) (18 - 18)  SpO2: 97% (29 Sep 2024 08:40) (93% - 97%)    Parameters below as of 29 Sep 2024 08:40  Patient On (Oxygen Delivery Method): room air        I&O's Detail      Daily     Daily     PHYSICAL EXAM:    Constitutional: NAD, awake and alert, well-developed  HEENT: PERRLA, EOMI,  No oral cyanosis. Oropharynx Clean and Dry.  Neck:  supple,  No JVD, No Thyroid enlargement. No Carotid Bruits bilaterally.  Respiratory: Breath sounds are clear bilaterally, No wheezing, rales or rhonchi  Cardiovascular: NL S1 and S2, RRR,   1/6 systolic ejection murmur, no gallops or rubs  Gastrointestinal: Bowel Sounds present.   Extremities: No peripheral edema. No clubbing or cyanosis.  Vascular: 2+ peripheral pulses in LE   Neurological: A/O x 3,  Musculoskeletal: no calf tenderness  Skin: No rashes or lessions.      LABS: All Labs Reviewed:                        15.2   7.49  )-----------( 202      ( 29 Sep 2024 06:46 )             46.9     09-29    140  |  109[H]  |  20  ----------------------------<  113[H]  4.2   |  27  |  1.47[H]    Ca    9.2      29 Sep 2024 06:46    TPro  7.1  /  Alb  3.4  /  TBili  1.2  /  DBili  x   /  AST  19  /  ALT  28  /  AlkPhos  70  09-27        - Troponin: <-8.79, <-11.26  LIVER FUNCTIONS - ( 27 Sep 2024 14:27 )  Alb: 3.4 g/dL / Pro: 7.1 gm/dL / ALK PHOS: 70 U/L / ALT: 28 U/L / AST: 19 U/L / GGT: x           PT/INR - ( 27 Sep 2024 14:27 )   PT: 12.7 sec;   INR: 1.08 ratio         PTT - ( 27 Sep 2024 14:27 )  PTT:34.8 sec  09-28 Chol 228[H] LDL -- HDL 39[L] Trig 160[H]    RADIOLOGY:    < from: CT Angio Chest PE Protocol w/ IV Cont (09.27.24 @ 17:05) >  IMPRESSION:  No acute pulmonary embolism    Trace bilateral pleural effusions with adjacent atelectasis    < end of copied text >  < from: Xray Chest 1 View- PORTABLE-Urgent (09.27.24 @ 15:36) >    IMPRESSION: Clear lungs at this time.    < end of copied text >    EKG:    < from: 12 Lead ECG (09.27.24 @ 14:17) >   Sinus rhythm with frequent and consecutive Premature ventricular complexes  Possible Inferior infarct , age undetermined  Abnormal ECG    < end of copied text >  ECHO:    < from: TTE W or WO Ultrasound Enhancing Agent (09.28.24 @ 08:14) >  1. Left ventricular cavity is normal in size. Left ventricular systolic function is low normal with an ejection fraction visually estimated at 50 to 55 %. Regional wall motion abnormalities present.   2. Mild left ventricular hypertrophy.   3. Basal and midanterolateral wall, basal inferoseptal segment, and apical lateral segment are abnormal. However, accuracy is limited due to frequent PVC's.   4. Basal and mid anterolateral wall, basal inferoseptal segment, and apical lateral segment are abnormal.   5. Normal left ventricular diastolic function.   6. Left atrium is normal in size.   7. The right atrium is normal in size.   8. Normal right ventricular cavity size, with normal wall thickness, and normal right ventricular systolic function.   9. Structurally normal mitral valve with normal leaflet excursion.  10. Structurally normal tricuspid valve with normal leaflet excursion.  11. Trileaflet aortic valve with normal systolic excursion.  12. The interatrial septum appears intact.  13. No pericardial effusion seen.    < end of copied text >      CT angiogram of coronaries 2023 July:  Mild coronary disease in all vessels.

## 2024-09-30 LAB
ANION GAP SERPL CALC-SCNC: 6 MMOL/L — SIGNIFICANT CHANGE UP (ref 5–17)
BASOPHILS # BLD AUTO: 0.04 K/UL — SIGNIFICANT CHANGE UP (ref 0–0.2)
BASOPHILS NFR BLD AUTO: 0.7 % — SIGNIFICANT CHANGE UP (ref 0–2)
BLD GP AB SCN SERPL QL: SIGNIFICANT CHANGE UP
BUN SERPL-MCNC: 22 MG/DL — SIGNIFICANT CHANGE UP (ref 7–23)
CALCIUM SERPL-MCNC: 9.1 MG/DL — SIGNIFICANT CHANGE UP (ref 8.5–10.1)
CHLORIDE SERPL-SCNC: 112 MMOL/L — HIGH (ref 96–108)
CO2 SERPL-SCNC: 24 MMOL/L — SIGNIFICANT CHANGE UP (ref 22–31)
CREAT SERPL-MCNC: 1.42 MG/DL — HIGH (ref 0.5–1.3)
EGFR: 58 ML/MIN/1.73M2 — LOW
EOSINOPHIL # BLD AUTO: 0.27 K/UL — SIGNIFICANT CHANGE UP (ref 0–0.5)
EOSINOPHIL NFR BLD AUTO: 4.6 % — SIGNIFICANT CHANGE UP (ref 0–6)
GLUCOSE SERPL-MCNC: 120 MG/DL — HIGH (ref 70–99)
HCT VFR BLD CALC: 45.5 % — SIGNIFICANT CHANGE UP (ref 39–50)
HGB BLD-MCNC: 15.2 G/DL — SIGNIFICANT CHANGE UP (ref 13–17)
IMM GRANULOCYTES NFR BLD AUTO: 0.5 % — SIGNIFICANT CHANGE UP (ref 0–0.9)
LYMPHOCYTES # BLD AUTO: 1.32 K/UL — SIGNIFICANT CHANGE UP (ref 1–3.3)
LYMPHOCYTES # BLD AUTO: 22.7 % — SIGNIFICANT CHANGE UP (ref 13–44)
MCHC RBC-ENTMCNC: 32.5 PG — SIGNIFICANT CHANGE UP (ref 27–34)
MCHC RBC-ENTMCNC: 33.4 GM/DL — SIGNIFICANT CHANGE UP (ref 32–36)
MCV RBC AUTO: 97.4 FL — SIGNIFICANT CHANGE UP (ref 80–100)
MONOCYTES # BLD AUTO: 0.72 K/UL — SIGNIFICANT CHANGE UP (ref 0–0.9)
MONOCYTES NFR BLD AUTO: 12.4 % — SIGNIFICANT CHANGE UP (ref 2–14)
NEUTROPHILS # BLD AUTO: 3.44 K/UL — SIGNIFICANT CHANGE UP (ref 1.8–7.4)
NEUTROPHILS NFR BLD AUTO: 59.1 % — SIGNIFICANT CHANGE UP (ref 43–77)
PLATELET # BLD AUTO: 174 K/UL — SIGNIFICANT CHANGE UP (ref 150–400)
POTASSIUM SERPL-MCNC: 4.3 MMOL/L — SIGNIFICANT CHANGE UP (ref 3.5–5.3)
POTASSIUM SERPL-SCNC: 4.3 MMOL/L — SIGNIFICANT CHANGE UP (ref 3.5–5.3)
RBC # BLD: 4.67 M/UL — SIGNIFICANT CHANGE UP (ref 4.2–5.8)
RBC # FLD: 12.6 % — SIGNIFICANT CHANGE UP (ref 10.3–14.5)
SODIUM SERPL-SCNC: 142 MMOL/L — SIGNIFICANT CHANGE UP (ref 135–145)
WBC # BLD: 5.82 K/UL — SIGNIFICANT CHANGE UP (ref 3.8–10.5)
WBC # FLD AUTO: 5.82 K/UL — SIGNIFICANT CHANGE UP (ref 3.8–10.5)

## 2024-09-30 PROCEDURE — 99233 SBSQ HOSP IP/OBS HIGH 50: CPT

## 2024-09-30 PROCEDURE — 93010 ELECTROCARDIOGRAM REPORT: CPT | Mod: 76

## 2024-09-30 RX ORDER — METOPROLOL TARTRATE 50 MG
25 TABLET ORAL DAILY
Refills: 0 | Status: DISCONTINUED | OUTPATIENT
Start: 2024-09-30 | End: 2024-10-01

## 2024-09-30 RX ORDER — PRASUGREL 5 MG/1
10 TABLET, FILM COATED ORAL DAILY
Refills: 0 | Status: DISCONTINUED | OUTPATIENT
Start: 2024-10-01 | End: 2024-10-01

## 2024-09-30 RX ADMIN — FINASTERIDE 5 MILLIGRAM(S): 5 TABLET, FILM COATED ORAL at 21:46

## 2024-09-30 RX ADMIN — Medication 0.8 MILLIGRAM(S): at 21:47

## 2024-09-30 RX ADMIN — Medication 81 MILLIGRAM(S): at 10:41

## 2024-09-30 RX ADMIN — Medication 70 MILLILITER(S): at 21:51

## 2024-09-30 RX ADMIN — Medication 70 MILLILITER(S): at 10:41

## 2024-09-30 RX ADMIN — Medication 25 MILLIGRAM(S): at 10:41

## 2024-09-30 RX ADMIN — ATORVASTATIN CALCIUM 40 MILLIGRAM(S): 10 TABLET, FILM COATED ORAL at 21:46

## 2024-09-30 RX ADMIN — Medication 70 MILLILITER(S): at 05:05

## 2024-09-30 RX ADMIN — Medication 1 DOSE(S): at 09:05

## 2024-09-30 NOTE — PROGRESS NOTE ADULT - ASSESSMENT
Pt is admitted w/      Presyncope  Light headedness  chest pressure  Freq PVCs  abn EKG  Hyperglycemia  CKD cr at baseline   Recent dental crown placed  due to broken tooth  Hx of Pna , 1 month ago  Hx of  BPH  Hx of JANAY untreated  Hx of  PVCs  Hx of Obesity    tele  echocardiogram with wall motion abnormalities and mild drop in ejection fraction.    for  cardiac catheterization today,  EP to place implantable cardiac monitor or loop recorder for long-term monitoring after cardiac cath   .Would not consider an EP study without a planned catheter ablation and the patient does not wish to proceed with ablation at this time.- EP eval appreciated   - s/p ASA 81mg x4  - s/p 1000 ml NS IVF in the ED  on  metoprolol 25mg po every12 hours  - CTA is neg for PE, shows trace bilat pl eff  - continue home meds of dutasteride, alfuzosin  - outpt f/u with Pulmonary for untreated JANAY  - DVT proph on lovenox    dispo- possible discharge tmrw , in cath lab now and thenEP f/u for loop recorder which will likely occur 10/1, then dc 10/1 likely       Pt is admitted w/      Presyncope  Light headedness  chest pressure  Freq PVCs  abn EKG  Hyperglycemia  CKD cr at baseline   Recent dental crown placed  due to broken tooth  Hx of Pna , 1 month ago  Hx of  BPH  Hx of JANAY untreated  Hx of  PVCs  Hx of Obesity    tele  echocardiogram with wall motion abnormalities and mild drop in ejection fraction.    for  cardiac catheterization today,  EP to place implantable cardiac monitor or loop recorder for long-term monitoring after cardiac cath   .Would not consider an EP study without a planned catheter ablation and the patient does not wish to proceed with ablation at this time.- EP eval appreciated   - s/p ASA 81mg x4  - s/p 1000 ml NS IVF in the ED  on  metoprolol 25mg po every12 hours  - CTA is neg for PE, shows trace bilat pl eff  - continue home meds of dutasteride, alfuzosin  - outpt f/u with Pulmonary for untreated JANAY  - DVT proph on lovenox  total time spent 55 mins    dispo- possible discharge tmrw , in cath lab now and thenEP f/u for loop recorder which will likely occur 10/1, then dc 10/1 likely

## 2024-09-30 NOTE — PROGRESS NOTE ADULT - SUBJECTIVE AND OBJECTIVE BOX
Nurse Practitioner Progress note:     HPI:  Pt is a pleasant 57 yo male with PMHx  recent Pneumonia ( Aug 2024) BPH, loose stools associated with IBS,  Obesity , untreated JANAY and PVCs,  who presented to [place] ED c/o 2 episodes of dizziness, diaphoresis and palpitations with chest pain/pressure 2/10.  His symptoms  occured  at 12: 30 pm today.  Pt was walking from his office to the nearby pharmacy when he felt light headed and nearly fainted.   His wife took him home and he ate something.  However he still felt "hazy and tired."     Pt had a crown placed on a broken tooth yesterday,  he had received novocaine and took 600mg motrin.       He reports occas cramping discomfort in his back and chest right ribs.   He  denies SOB,  no n/v/ d,  no urinary complaints,  no edema/calf pain,  no URI complaints, no fever .  Pt drank 16 oz of coffee this AM and reports he does also drink soda with caffeine.     For a prior history of PVCs pt has seen  an EP doctor/ cardiologist from [place].   (27 Sep 2024 23:53)              T(C): 36.4 (09-30-24 @ 14:39), Max: 36.8 (09-29-24 @ 21:03)  HR: 69 (09-30-24 @ 14:39) (60 - 83)  BP: 143/91 (09-30-24 @ 14:39) (120/69 - 150/79)  RR: 15 (09-30-24 @ 14:39) (15 - 18)  SpO2: 100% (09-30-24 @ 14:39) (94% - 100%)  Wt(kg): --    PHYSICAL EXAM:  Neurologic: Non-focal, AxOx3.  No neuro deficits  Vascular: Peripheral pulses palpable 2+ bilaterally  Procedure Site: Rt. radial band in place site benign soft no bleeding no hematoma +1 radial pulse no c/o numbness/tingling, <3sec cap refill, fingers/hand warm to touch       	        PROCEDURE RESULTS:  S/P LHC S/P CAROLYN to proximal LAD      ASSESSMENT/PLAN: 	  -Admit to CICU  -VS, labs, diet, activity as per PCI orders  -IV hydration  -Encourage PO fluids  -ASA 81mg  -Effient 10mg   -Lipitor 40mg   -Lopressor 25mg Q12hr   -Sedation instructions reviewed with patient   -Plan of care D/W pt. and MD  -Discussed therapeutic lifestyle changes to reduce risk factors such as following a cardiac diet, weight loss, maintaining a healthy weight, exercise, smoking cessation, medication compliance, and regular follow-up  with MD to know our numbers (BP, cholesterol, weight, and glucose  - Follow-up AM labs/EKG/site check  -Follow-up with attending/cardiologist       Nurse Practitioner Progress note:     HPI:  59 yo male with PMHx  recent Pneumonia ( Aug 2024) BPH, loose stools associated with IBS, Obesity , JANAY  currently using a oral appliance, and PVCs( extensive workup in past including multiple event monitors and CT angiogram of coronary arteries), who presented to [place] ED c/o 2 episodes of dizziness, diaphoresis and palpitations with chest pain/pressure 2/10.  Pt was walking from his office to the nearby pharmacy when he felt light headed and nearly fainted.   His wife took him home and he ate something.  However he still felt "hazy and tired."   Pt had a crown placed on a broken tooth yesterday,  he had received novocaine and took 600mg motrin. after going home he came back to the drugstore again to pick something up and had a 2nd event and that prompted the emergency room visit.  The 2nd event also had a prodrome and presyncopal.  He reports occas cramping discomfort in his back and chest right ribs.   He denies SOB,  no n/v/ d,  no urinary complaints,  no edema/calf pain,  no URI complaints, no fever.   patient was supposed to be evaluated for sleep apnea again because it was felt that it was worse but was not done due to insurance issues.  He has had multiple monitors with high volumes of PVCs with his never had further intervention than that for it.    Pt. referred for [facility] for further evaluation           T(C): 36.4 (09-30-24 @ 14:39), Max: 36.8 (09-29-24 @ 21:03)  HR: 69 (09-30-24 @ 14:39) (60 - 83)  BP: 143/91 (09-30-24 @ 14:39) (120/69 - 150/79)  RR: 15 (09-30-24 @ 14:39) (15 - 18)  SpO2: 100% (09-30-24 @ 14:39) (94% - 100%)  Wt(kg): --    PHYSICAL EXAM:  Neurologic: Non-focal, AxOx3.  No neuro deficits  Vascular: Peripheral pulses palpable 2+ bilaterally  Procedure Site: Rt. radial band in place site benign soft no bleeding no hematoma +1 radial pulse no c/o numbness/tingling, <3sec cap refill, fingers/hand warm to touch       	        PROCEDURE RESULTS:  S/P LHC S/P CAROLYN to proximal LAD      ASSESSMENT/PLAN: 	  59 yo male with PMHx  recent Pneumonia ( Aug 2024) BPH, loose stools associated with IBS, Obesity , JANAY  currently using a oral appliance, and PVCs( extensive workup in past including multiple event monitors and CT angiogram of coronary arteries), who presented to [place] ED c/o 2 episodes of dizziness, diaphoresis and palpitations with chest pain/pressure 2/10. Presyncope with prodrome in the setting of a low longstanding history of large volume PVCs with sleep apnea likely more severe than mild. Echocardiogram with wall motion abnormalities and mild drop in ejection fraction.  Further evaluation with cardiac catheterization and EP consultation for the possibility of an EP study verses implantable loop recorder for further evaluation of syncope.  S/P [facility] S/P CAROLYN     -Admit to CCU  -VS, labs, diet, activity as per PCI orders  -IV hydration  -Encourage PO fluids  -ASA 81mg  -Effient 10mg   -Lipitor 40mg   -Lopressor 25mg Q12hr   -Sedation instructions reviewed with patient   -EP to F/U in AM  -Plan of care D/W pt. and MD  -Discussed therapeutic lifestyle changes to reduce risk factors such as following a cardiac diet, weight loss, maintaining a healthy weight, exercise, smoking cessation, medication compliance, and regular follow-up  with MD to know our numbers (BP, cholesterol, weight, and glucose  - Follow-up AM labs/EKG/site check  -Follow-up with attending/cardiologist       Nurse Practitioner Progress note:     HPI:  57 yo male with PMHx  recent Pneumonia ( Aug 2024) BPH, loose stools associated with IBS, Obesity , JANAY  currently using a oral appliance, and PVCs( extensive workup in past including multiple event monitors and CT angiogram of coronary arteries), who presented to [place] ED c/o 2 episodes of dizziness, diaphoresis and palpitations with chest pain/pressure 2/10.  Pt was walking from his office to the nearby pharmacy when he felt light headed and nearly fainted.   His wife took him home and he ate something.  However he still felt "hazy and tired."   Pt had a crown placed on a broken tooth yesterday,  he had received novocaine and took 600mg motrin. after going home he came back to the drugstore again to pick something up and had a 2nd event and that prompted the emergency room visit.  The 2nd event also had a prodrome and presyncopal.  He reports occasional  cramping discomfort in his back and chest right ribs.   He denies SOB,  no n/v/ d,  no urinary complaints,  no edema/calf pain,  no URI complaints, no fever. Patient was supposed to be evaluated for sleep apnea again because it was felt that it was worse but was not done due to insurance issues.  He has had multiple monitors with high volumes of PVCs with his never had further intervention than that for it.    Pt. referred for [facility] for further evaluation     Consent obtained for and signed for cardiac cath with coronary angiogram and possible stent placement with possible sedation and analgesia. C risks, benefits and alternatives discussed with patient. Risk discussed included, but not limited to MI, stroke, mortality, major bleeding, arrythmia, or infection, educational material provided. Pt. verbalizes and understands pre-procedural instructions.   ASA: II   Bleeding Risk Score: 1.5  Creatinine: 1.42  GFR:  58  Andres Score:1 point     T(C): 36.4 (09-30-24 @ 14:39), Max: 36.8 (09-29-24 @ 21:03)  HR: 69 (09-30-24 @ 14:39) (60 - 83)  BP: 143/91 (09-30-24 @ 14:39) (120/69 - 150/79)  RR: 15 (09-30-24 @ 14:39) (15 - 18)  SpO2: 100% (09-30-24 @ 14:39) (94% - 100%)  Wt(kg): --    PHYSICAL EXAM:  Neurologic: Non-focal, AxOx3.  No neuro deficits  Vascular: Peripheral pulses palpable 2+ bilaterally  Procedure Site: Rt. radial band in place site benign soft no bleeding no hematoma +1 radial pulse no c/o numbness/tingling, <3sec cap refill, fingers/hand warm to touch     PROCEDURE RESULTS:  S/P [facility] S/P CAROLYN to proximal LAD      ASSESSMENT/PLAN: 	  57 yo male with PMHx  recent Pneumonia ( Aug 2024) BPH, loose stools associated with IBS, Obesity , JANAY  currently using a oral appliance, and PVCs( extensive workup in past including multiple event monitors and CT angiogram of coronary arteries), who presented to [place] ED c/o 2 episodes of dizziness, diaphoresis and palpitations with chest pain/pressure 2/10. Presyncope with prodrome in the setting of a low longstanding history of large volume PVCs with sleep apnea likely more severe than mild. Echocardiogram with wall motion abnormalities and mild drop in ejection fraction.  Further evaluation with cardiac catheterization and EP consultation for the possibility of an EP study verses implantable loop recorder for further evaluation of syncope.  S/P [facility] S/P CAROLYN     -Admit to CCU  -VS, labs, diet, activity as per PCI orders  -IV hydration  -Encourage PO fluids  -ASA 81mg  -Effient 10mg   -Lipitor 40mg   -Lopressor 25mg Q12hr   -Sedation instructions reviewed with patient   -EP to F/U in AM  -Plan of care D/W pt. and MD  -Discussed therapeutic lifestyle changes to reduce risk factors such as following a cardiac diet, weight loss, maintaining a healthy weight, exercise, smoking cessation, medication compliance, and regular follow-up  with MD to know our numbers (BP, cholesterol, weight, and glucose  - Follow-up AM labs/EKG/site check  -Follow-up with attending/cardiologist       Nurse Practitioner Progress note:     HPI:  59 yo male with PMHx  recent Pneumonia ( Aug 2024) BPH, loose stools associated with IBS, Obesity , JANAY  currently using a oral appliance, and PVCs( extensive workup in past including multiple event monitors and CT angiogram of coronary arteries), who presented to [place] ED c/o 2 episodes of dizziness, diaphoresis and palpitations with chest pain/pressure 2/10.  Pt was walking from his office to the nearby pharmacy when he felt light headed and nearly fainted.   His wife took him home and he ate something.  However he still felt "hazy and tired."   Pt had a crown placed on a broken tooth yesterday,  he had received novocaine and took 600mg motrin. after going home he came back to the drugstore again to pick something up and had a 2nd event and that prompted the emergency room visit.  The 2nd event also had a prodrome and presyncopal.  He reports occasional  cramping discomfort in his back and chest right ribs.   He denies SOB,  no n/v/ d,  no urinary complaints,  no edema/calf pain,  no URI complaints, no fever. Patient was supposed to be evaluated for sleep apnea again because it was felt that it was worse but was not done due to insurance issues.  He has had multiple monitors with high volumes of PVCs with his never had further intervention than that for it.    Pt. referred for [facility] for further evaluation     Consent obtained for and signed for cardiac cath with coronary angiogram and possible stent placement with possible sedation and analgesia. C risks, benefits and alternatives discussed with patient. Risk discussed included, but not limited to MI, stroke, mortality, major bleeding, arrythmia, or infection, educational material provided. Pt. verbalizes and understands pre-procedural instructions.   ASA: II   Bleeding Risk Score: 1.5  Creatinine: 1.42  GFR:  58  Andres Score:1 point     T(C): 36.4 (09-30-24 @ 14:39), Max: 36.8 (09-29-24 @ 21:03)  HR: 69 (09-30-24 @ 14:39) (60 - 83)  BP: 143/91 (09-30-24 @ 14:39) (120/69 - 150/79)  RR: 15 (09-30-24 @ 14:39) (15 - 18)  SpO2: 100% (09-30-24 @ 14:39) (94% - 100%)  Wt(kg): --    PHYSICAL EXAM:  Neurologic: Non-focal, AxOx3.  No neuro deficits  Vascular: Peripheral pulses palpable 2+ bilaterally  Procedure Site: Rt. radial band in place site benign soft no bleeding no hematoma +1 radial pulse no c/o numbness/tingling, <3sec cap refill, fingers/hand warm to touch     PROCEDURE RESULTS:  S/P [facility] S/P CAROLYN to proximal LAD      ASSESSMENT/PLAN: 	  59 yo male with PMHx  recent Pneumonia ( Aug 2024) BPH, loose stools associated with IBS, Obesity , JANAY  currently using a oral appliance, and PVCs( extensive workup in past including multiple event monitors and CT angiogram of coronary arteries), who presented to [place] ED c/o 2 episodes of dizziness, diaphoresis and palpitations with chest pain/pressure 2/10. Presyncope with prodrome in the setting of a low longstanding history of large volume PVCs with sleep apnea likely more severe than mild. Echocardiogram with wall motion abnormalities and mild drop in ejection fraction.  Further evaluation with cardiac catheterization and EP consultation for the possibility of an EP study verses implantable loop recorder for further evaluation of syncope.  S/P [facility] S/P CAROLYN     -Admit to CCU  -VS, labs, diet, activity as per PCI orders  -IV hydration  -Encourage PO fluids  -ASA 81mg  -Effient 10mg   -Lipitor 40mg   -Toprol XL 25mg   -Sedation instructions reviewed with patient   -EP to F/U in AM  -Plan of care D/W pt. and MD  -Discussed therapeutic lifestyle changes to reduce risk factors such as following a cardiac diet, weight loss, maintaining a healthy weight, exercise, smoking cessation, medication compliance, and regular follow-up  with MD to know our numbers (BP, cholesterol, weight, and glucose  - Follow-up AM labs/EKG/site check  -Follow-up with attending/cardiologist       IC Attending/Nurse Practitioner Progress note:     HPI:  59 yo male with PMHx  recent Pneumonia ( Aug 2024) BPH, loose stools associated with IBS, Obesity , JANAY  currently using a oral appliance, and PVCs( extensive workup in past including multiple event monitors and CT angiogram of coronary arteries), who presented to [place] ED c/o 2 episodes of dizziness, diaphoresis and palpitations with chest pain/pressure 2/10.  Pt was walking from his office to the nearby pharmacy when he felt light headed and nearly fainted.   His wife took him home and he ate something.  However he still felt "hazy and tired."   Pt had a crown placed on a broken tooth yesterday,  he had received novocaine and took 600mg motrin. after going home he came back to the drugstore again to pick something up and had a 2nd event and that prompted the emergency room visit.  The 2nd event also had a prodrome and presyncopal.  He reports occasional  cramping discomfort in his back and chest right ribs.   He denies SOB,  no n/v/ d,  no urinary complaints,  no edema/calf pain,  no URI complaints, no fever. Patient was supposed to be evaluated for sleep apnea again because it was felt that it was worse but was not done due to insurance issues.  He has had multiple monitors with high volumes of PVCs with his never had further intervention than that for it.    Pt. referred for [facility] for further evaluation     Consent obtained for and signed for cardiac cath with coronary angiogram and possible stent placement with possible sedation and analgesia. C risks, benefits and alternatives discussed with patient. Risk discussed included, but not limited to MI, stroke, mortality, major bleeding, arrythmia, or infection, educational material provided. Pt. verbalizes and understands pre-procedural instructions.   ASA: II   Bleeding Risk Score: 1.5  Creatinine: 1.42  GFR:  58  Andres Score:1 point     T(C): 36.4 (09-30-24 @ 14:39), Max: 36.8 (09-29-24 @ 21:03)  HR: 69 (09-30-24 @ 14:39) (60 - 83)  BP: 143/91 (09-30-24 @ 14:39) (120/69 - 150/79)  RR: 15 (09-30-24 @ 14:39) (15 - 18)  SpO2: 100% (09-30-24 @ 14:39) (94% - 100%)  Wt(kg): --    PHYSICAL EXAM:  Neurologic: Non-focal, AxOx3.  No neuro deficits  Vascular: Peripheral pulses palpable 2+ bilaterally  Procedure Site: Rt. radial band in place site benign soft no bleeding no hematoma +1 radial pulse no c/o numbness/tingling, <3sec cap refill, fingers/hand warm to touch     PROCEDURE RESULTS:  S/P LHC S/P CAROLYN to proximal LAD

## 2024-09-30 NOTE — PROGRESS NOTE ADULT - SUBJECTIVE AND OBJECTIVE BOX
HPI: Pt is a pleasant 58 year old man with recent Pneumonia ( Aug 2024) BPH, loose stools associated with IBS,  Obesity , untreated JANAY and PVCs,  who presented to [facility] ED c/o 2 episodes of dizziness, diaphoresis and palpitations with chest pain/pressure 2/10.  His symptoms  occured  at 12: 30 pm today.  Pt was walking from his office to the nearby pharmacy when he felt light headed and nearly fainted.   His wife took him home and he ate something.  However he still felt "hazy and tired."     Pt had a crown placed on a broken tooth yesterday,  he had received novocaine and took 600mg motrin.    He reports occas cramping discomfort in his back and chest right ribs.   He  denies SOB,  no n/v/ d,  no urinary complaints,  no edema/calf pain,  no URI complaints, no fever .  Pt drank 16 oz of coffee this AM and reports he does also drink soda with caffeine.  For a prior history of PVCs pt has seen  an EP doctor/ cardiologist from [place] ( Dr. NARENDRA Ayala) .      TELE: Patient with pretty significant PVC burden, intermittently in Ventricular bigeminy. Had about 2-3 episodes of NSVT on 9/29/24 around 10 pm.     9/28/24 TTE CONCLUSIONS:   1. Left ventricular cavity is normal in size. Left ventricular systolic function is low normal with an ejection fraction visually estimated at 50 to 55 %. Regional wall motion abnormalities present.   2. Mild left ventricular hypertrophy.   3. Basal and midanterolateral wall, basal inferoseptal segment, and apical lateral segment are abnormal. However, accuracy is limited due to frequent PVC's.   4. Basal and mid anterolateral wall, basal inferoseptal segment, and apical lateral segment are abnormal.   5. Normal left ventricular diastolic function.   6. Left atrium is normal in size.   7. The right atrium is normal in size.   8. Normal right ventricular cavity size, with normal wall thickness, and normal right ventricular systolic function.   9. Structurally normal mitral valve with normal leaflet excursion.  10. Structurally normal tricuspid valve with normal leaflet excursion.  11. Trileaflet aortic valve with normal systolic excursion.  12. The interatrial septum appears intact.  13. No pericardial effusion seen.      MEDICATIONS  (STANDING):  aspirin enteric coated 81 milliGRAM(s) Oral daily  atorvastatin 40 milliGRAM(s) Oral at bedtime  enoxaparin Injectable 40 milliGRAM(s) SubCutaneous every 24 hours  finasteride 5 milliGRAM(s) Oral at bedtime  fluticasone propionate/ salmeterol 250-50 MICROgram(s) Diskus 1 Dose(s) Inhalation two times a day  metoprolol tartrate 25 milliGRAM(s) Oral every 12 hours  sodium chloride 0.9%. 1000 milliLiter(s) (70 mL/Hr) IV Continuous <Continuous>  tamsulosin 0.8 milliGRAM(s) Oral at bedtime    MEDICATIONS  (PRN):  albuterol    90 MICROgram(s) HFA Inhaler 2 Puff(s) Inhalation every 6 hours PRN Shortness of Breath and/or Wheezing      Allergies    sorbitol (Diarrhea)  dairy products (Diarrhea)  penicillin (Unknown)    Intolerances        Vital Signs Last 24 Hrs  T(C): 36.3 (30 Sep 2024 07:58), Max: 36.8 (29 Sep 2024 21:03)  T(F): 97.3 (30 Sep 2024 07:58), Max: 98.2 (29 Sep 2024 21:03)  HR: 71 (30 Sep 2024 07:58) (60 - 83)  BP: 136/81 (30 Sep 2024 07:58) (120/69 - 136/81)  BP(mean): --  RR: 18 (30 Sep 2024 07:58) (18 - 18)  SpO2: 94% (30 Sep 2024 07:58) (94% - 96%)    Parameters below as of 30 Sep 2024 07:58  Patient On (Oxygen Delivery Method): room air        PHYSICAL EXAMINATION:    GENERAL APPEARANCE:  Pt. is not currently dyspneic, in no distress. Pt. is alert, oriented, and pleasant.  HEENT:  Pupils are normal and react normally. No icterus. Mucous membranes well colored.  NECK:  Supple. No lymphadenopathy. Jugular venous pressure not elevated. Carotids equal.   HEART:   The cardiac impulse has a normal quality. There are no murmurs, rubs or gallops noted  CHEST:  Chest is clear to auscultation. Normal respiratory effort.  ABDOMEN:  Soft and nontender.   EXTREMITIES:  There is no edema.   SKIN:  No rash or significant lesions are noted.    LABS:                        15.2   5.82  )-----------( 174      ( 30 Sep 2024 07:13 )             45.5     09-30    142  |  112[H]  |  22  ----------------------------<  120[H]  4.3   |  24  |  1.42[H]    Ca    9.1      30 Sep 2024 07:13        Urinalysis Basic - ( 30 Sep 2024 07:13 )    Color: x / Appearance: x / SG: x / pH: x  Gluc: 120 mg/dL / Ketone: x  / Bili: x / Urobili: x   Blood: x / Protein: x / Nitrite: x   Leuk Esterase: x / RBC: x / WBC x   Sq Epi: x / Non Sq Epi: x / Bacteria: x            RADIOLOGY & ADDITIONAL TESTS:    CTA Chest 9/27/24 IMPRESSION:  No acute pulmonary embolism  Trace bilateral pleural effusions with adjacent atelectasis   HPI: Pt is a pleasant 58 year old man with recent Pneumonia ( Aug 2024) BPH, loose stools associated with IBS,  Obesity , untreated JANAY and PVCs,  who presented to [facility] ED c/o 2 episodes of dizziness, diaphoresis and palpitations with chest pain/pressure 2/10.  His symptoms  occured  at 12: 30 pm today.  Pt was walking from his office to the nearby pharmacy when he felt light headed and nearly fainted.   His wife took him home and he ate something.  However he still felt "hazy and tired."     Pt had a crown placed on a broken tooth yesterday,  he had received novocaine and took 600mg motrin.    He reports occas cramping discomfort in his back and chest right ribs.   He  denies SOB,  no n/v/ d,  no urinary complaints,  no edema/calf pain,  no URI complaints, no fever .  Pt drank 16 oz of coffee this AM and reports he does also drink soda with caffeine.  For a prior history of PVCs pt has seen  an EP doctor/ cardiologist from [place] ( Dr. NARENDRA Ayala) .      TELE: Patient with pretty significant PVC burden, intermittently in Ventricular bigeminy. Had about 2-3 episodes of NSVT on 9/29/24 around 10 pm.     9/28/24 TTE CONCLUSIONS:   1. Left ventricular cavity is normal in size. Left ventricular systolic function is low normal with an ejection fraction visually estimated at 50 to 55 %. Regional wall motion abnormalities present.   2. Mild left ventricular hypertrophy.   3. Basal and midanterolateral wall, basal inferoseptal segment, and apical lateral segment are abnormal. However, accuracy is limited due to frequent PVC's.   4. Basal and mid anterolateral wall, basal inferoseptal segment, and apical lateral segment are abnormal.   5. Normal left ventricular diastolic function.   6. Left atrium is normal in size.   7. The right atrium is normal in size.   8. Normal right ventricular cavity size, with normal wall thickness, and normal right ventricular systolic function.   9. Structurally normal mitral valve with normal leaflet excursion.  10. Structurally normal tricuspid valve with normal leaflet excursion.  11. Trileaflet aortic valve with normal systolic excursion.  12. The interatrial septum appears intact.  13. No pericardial effusion seen.      MEDICATIONS  (STANDING):  aspirin enteric coated 81 milliGRAM(s) Oral daily  atorvastatin 40 milliGRAM(s) Oral at bedtime  enoxaparin Injectable 40 milliGRAM(s) SubCutaneous every 24 hours  finasteride 5 milliGRAM(s) Oral at bedtime  fluticasone propionate/ salmeterol 250-50 MICROgram(s) Diskus 1 Dose(s) Inhalation two times a day  metoprolol tartrate 25 milliGRAM(s) Oral every 12 hours  sodium chloride 0.9%. 1000 milliLiter(s) (70 mL/Hr) IV Continuous <Continuous>  tamsulosin 0.8 milliGRAM(s) Oral at bedtime    MEDICATIONS  (PRN):  albuterol    90 MICROgram(s) HFA Inhaler 2 Puff(s) Inhalation every 6 hours PRN Shortness of Breath and/or Wheezing      Allergies    sorbitol (Diarrhea)  dairy products (Diarrhea)  penicillin (Unknown)    Intolerances        Vital Signs Last 24 Hrs  T(C): 36.3 (30 Sep 2024 07:58), Max: 36.8 (29 Sep 2024 21:03)  T(F): 97.3 (30 Sep 2024 07:58), Max: 98.2 (29 Sep 2024 21:03)  HR: 71 (30 Sep 2024 07:58) (60 - 83)  BP: 136/81 (30 Sep 2024 07:58) (120/69 - 136/81)  BP(mean): --  RR: 18 (30 Sep 2024 07:58) (18 - 18)  SpO2: 94% (30 Sep 2024 07:58) (94% - 96%)    Parameters below as of 30 Sep 2024 07:58  Patient On (Oxygen Delivery Method): room air        PHYSICAL EXAMINATION:    GENERAL APPEARANCE:  Pt. is not currently dyspneic, in no distress. Pt. is alert, oriented, and pleasant.  HEENT:  Pupils are normal and react normally. No icterus. Mucous membranes well colored.  NECK:  Supple. No lymphadenopathy. Jugular venous pressure not elevated. Carotids equal.   HEART:   The cardiac impulse has a normal quality. There are no murmurs, rubs or gallops noted  CHEST:  Chest is clear to auscultation. Normal respiratory effort.  ABDOMEN:  Soft and nontender.   EXTREMITIES:  There is no edema.   SKIN:  No rash or significant lesions are noted.    LABS:                        15.2   5.82  )-----------( 174      ( 30 Sep 2024 07:13 )             45.5     09-30    142  |  112[H]  |  22  ----------------------------<  120[H]  4.3   |  24  |  1.42[H]    Ca    9.1      30 Sep 2024 07:13        Urinalysis Basic - ( 30 Sep 2024 07:13 )    Color: x / Appearance: x / SG: x / pH: x  Gluc: 120 mg/dL / Ketone: x  / Bili: x / Urobili: x   Blood: x / Protein: x / Nitrite: x   Leuk Esterase: x / RBC: x / WBC x   Sq Epi: x / Non Sq Epi: x / Bacteria: x        RADIOLOGY & ADDITIONAL TESTS:    CTA Chest 9/27/24 IMPRESSION:  No acute pulmonary embolism  Trace bilateral pleural effusions with adjacent atelectasis

## 2024-09-30 NOTE — PROGRESS NOTE ADULT - ASSESSMENT
ASSESSMENT/PLAN: 	  57 yo male with PMHx  recent Pneumonia ( Aug 2024) BPH, loose stools associated with IBS, Obesity , JANAY  currently using a oral appliance, and PVCs( extensive workup in past including multiple event monitors and CT angiogram of coronary arteries), who presented to [facility] ED c/o 2 episodes of dizziness, diaphoresis and palpitations with chest pain/pressure 2/10. Presyncope with prodrome in the setting of a low longstanding history of large volume PVCs with sleep apnea likely more severe than mild. Echocardiogram with wall motion abnormalities and mild drop in ejection fraction.  Further evaluation with cardiac catheterization and EP consultation for the possibility of an EP study verses implantable loop recorder for further evaluation of syncope.  S/P C S/P CAROLYN     -Admit to CCU  -VS, labs, diet, activity as per PCI orders  -IV hydration  -Encourage PO fluids  -ASA 81mg  -Effient 10mg   -Lipitor 40mg   -Toprol XL 25mg   -Sedation instructions reviewed with patient   -EP to F/U in AM  -Plan of care D/W pt. and MD  -Discussed therapeutic lifestyle changes to reduce risk factors such as following a cardiac diet, weight loss, maintaining a healthy weight, exercise, smoking cessation, medication compliance, and regular follow-up  with MD to know our numbers (BP, cholesterol, weight, and glucose  - Follow-up AM labs/EKG/site check  -Follow-up with attending/cardiologist        I was physically present for key portions of this E&M service. I have reviewed the plan and discussed it with the NP/Pt. and have edited the note where it is appropriate.

## 2024-09-30 NOTE — PROVIDER CONTACT NOTE (OTHER) - BACKGROUND
Pt being treated for presyncope, lightheadedness, and chest pressure. Pt s/p LHC through R radial with 1 CAROLYN to LAD.

## 2024-09-30 NOTE — PROGRESS NOTE ADULT - ASSESSMENT
ASSESSMENT & PLAN: This is a 58-year-old gentleman with history of PVCs, sleep apnea, a recent CT angio with mild coronary disease who presents with an episode of near syncope.  This was 1 of 2 episodes that occurred on the day of admission both occurred in a standing position with a prodrome of lightheadedness dizziness and chest discomfort.  He denies any palpitations or tachycardia.  She did not lose consciousness and maintain postural tone. Clinical history sounds like a vasovagal origin of his symptoms.    Presyncope of unclear origin ECG on presentation demonstrated frequent RVOT morphology PVCs.  He is scheduled for cardiac catheterization in the morning, and we will plan further evaluation pending the outcome of that study will likely recommend a implantable cardiac monitor or loop recorder for long-term monitoring.  Would not consider an EP study without a planned catheter ablation and the patient does not wish to proceed with ablation at this time. ASSESSMENT & PLAN: This is a 58-year-old gentleman with history of PVCs, sleep apnea, a recent CT angio with mild coronary disease who presents with an episode of near syncope.  This was 1 of 2 episodes that occurred on the day of admission both occurred in a standing position with a prodrome of lightheadedness dizziness and chest discomfort.  He denies any palpitations or tachycardia.  She did not lose consciousness and maintain postural tone. Clinical history sounds like a vasovagal origin of his symptoms.    Presyncope of unclear origin ECG on presentation demonstrated frequent RVOT morphology PVCs.  He is scheduled for cardiac catheterization today, and we will plan further evaluation pending the outcome of that study will likely recommend a implantable cardiac monitor or loop recorder for long-term monitoring.  Would not consider an EP study without a planned catheter ablation and the patient does not wish to proceed with ablation at this time.    Case discussed with faculty attending (Dr. Mejia). Recommendations are only final once signed and addendum is added by him.

## 2024-09-30 NOTE — PROGRESS NOTE ADULT - SUBJECTIVE AND OBJECTIVE BOX
59 yo male with PMHx  recent Pneumonia ( Aug 2024) BPH, loose stools associated with IBS,  Obesity , untreated JANAY and PVCs,  who presented to [facility] ED c/o 2 episodes of dizziness, diaphoresis and palpitations with chest pain/pressure 2/10.  His symptoms  occured  at 12: 30 pm today.  Pt was walking from his office to the nearby pharmacy when he felt light headed and nearly fainted.   His wife took him home and he ate something.  However he still felt "hazy and tired."     Pt had a crown placed on a broken tooth yesterday,  he had received novocaine and took 600mg motrin.       He reports occas cramping discomfort in his back and chest right ribs.   He  denies SOB,  no n/v/ d,  no urinary complaints,  no edema/calf pain,  no URI complaints, no fever .  Pt drank 16 oz of coffee this AM and reports he does also drink soda with caffeine.     For a prior history of PVCs pt has seen  an EP doctor/ cardiologist from [place].      9/30 no further dizziness, at this time, no cp  no sob , reports being comfortable, for cath labs today      PHYSICAL EXAM:    Daily     Daily     ICU Vital Signs Last 24 Hrs  T(C): 36.4 (30 Sep 2024 14:39), Max: 36.8 (29 Sep 2024 21:03)  T(F): 97.6 (30 Sep 2024 14:39), Max: 98.2 (29 Sep 2024 21:03)  HR: 69 (30 Sep 2024 14:39) (60 - 83)  BP: 143/91 (30 Sep 2024 14:39) (120/69 - 150/79)  BP(mean): --  ABP: --  ABP(mean): --  RR: 15 (30 Sep 2024 14:39) (15 - 18)  SpO2: 100% (30 Sep 2024 14:39) (94% - 100%)    O2 Parameters below as of 30 Sep 2024 14:39  Patient On (Oxygen Delivery Method): room air    PHYSICAL EXAM:Constitutional: NAD  HEENT: Atraumatic, DEBBI, Normal, No congestion  Respiratory: Breath Sounds normal, no rhonchi/wheeze  Cardiovascular: N S1S2;  Gastrointestinal: Abdomen soft, non tender, Bowel Ssounds present  Extremities: No edema, peripheral pulses present  Neurological: AAO x 3, no gross focal motor deficits  Skin: Non cellulitic, no rash, ulcers                                      15.2   5.82  )-----------( 174      ( 30 Sep 2024 07:13 )             45.5       CBC Full  -  ( 30 Sep 2024 07:13 )  WBC Count : 5.82 K/uL  RBC Count : 4.67 M/uL  Hemoglobin : 15.2 g/dL  Hematocrit : 45.5 %  Platelet Count - Automated : 174 K/uL  Mean Cell Volume : 97.4 fl  Mean Cell Hemoglobin : 32.5 pg  Mean Cell Hemoglobin Concentration : 33.4 gm/dL  Auto Neutrophil # : 3.44 K/uL  Auto Lymphocyte # : 1.32 K/uL  Auto Monocyte # : 0.72 K/uL  Auto Eosinophil # : 0.27 K/uL  Auto Basophil # : 0.04 K/uL  Auto Neutrophil % : 59.1 %  Auto Lymphocyte % : 22.7 %  Auto Monocyte % : 12.4 %  Auto Eosinophil % : 4.6 %  Auto Basophil % : 0.7 %      09-30    142  |  112[H]  |  22  ----------------------------<  120[H]  4.3   |  24  |  1.42[H]    Ca    9.1      30 Sep 2024 07:13                      Urinalysis Basic - ( 30 Sep 2024 07:13 )    Color: x / Appearance: x / SG: x / pH: x  Gluc: 120 mg/dL / Ketone: x  / Bili: x / Urobili: x   Blood: x / Protein: x / Nitrite: x   Leuk Esterase: x / RBC: x / WBC x   Sq Epi: x / Non Sq Epi: x / Bacteria: x            MEDICATIONS  (STANDING):  aspirin enteric coated 81 milliGRAM(s) Oral daily  atorvastatin 40 milliGRAM(s) Oral at bedtime  enoxaparin Injectable 40 milliGRAM(s) SubCutaneous every 24 hours  finasteride 5 milliGRAM(s) Oral at bedtime  fluticasone propionate/ salmeterol 250-50 MICROgram(s) Diskus 1 Dose(s) Inhalation two times a day  metoprolol tartrate 25 milliGRAM(s) Oral every 12 hours  sodium chloride 0.9%. 1000 milliLiter(s) (70 mL/Hr) IV Continuous <Continuous>  tamsulosin 0.8 milliGRAM(s) Oral at bedtime

## 2024-09-30 NOTE — PROGRESS NOTE ADULT - ASSESSMENT
Presyncope with prodrome in the setting of a low longstanding history of large volume PVCs with sleep apnea likely more severe than mild.   echocardiogram with wall motion abnormalities and mild drop in ejection fraction.  Further evaluation with cardiac catheterization and EP consultation for the possibility of an EP study verses implantable loop recorder for further evaluation of syncope.  Will need more evaluation for sleep apnea as probably a contributing factor as well.        9/29/2024:  Cardiac catheterization scheduled for further evaluation for ischemic heart disease.  We will start hydration later today in preparation for procedure tomorrow.  EP consult appreciated.  Further recommendations based on results of catheterization.    9/30/24:  s/p PCI of LAD.  Started on ASA and Effient.  EP FU tomorrow .  Continue on Metoprolol and Atorvastatin.  DC pending EP input.     case discussed with patient, family,  and staff

## 2024-09-30 NOTE — PROGRESS NOTE ADULT - SUBJECTIVE AND OBJECTIVE BOX
HPI:  57 yo male with PMHx  recent Pneumonia ( Aug 2024) BPH, loose stools associated with IBS, Obesity , JANAY  currently using a oral appliance, and PVCs( extensive workup in past including multiple event monitors and CT angiogram of coronary arteries), who presented to [place] ED c/o 2 episodes of dizziness, diaphoresis and palpitations with chest pain/pressure 2/10.  Pt was walking from his office to the nearby pharmacy when he felt light headed and nearly fainted.   His wife took him home and he ate something.  However he still felt "hazy and tired."   Pt had a crown placed on a broken tooth yesterday,  he had received novocaine and took 600mg motrin. after going home he came back to the drugstore again to pick something up and had a 2nd event and that prompted the emergency room visit.  The 2nd event also had a prodrome and presyncopal.  He reports occas cramping discomfort in his back and chest right ribs.   He denies SOB,  no n/v/ d,  no urinary complaints,  no edema/calf pain,  no URI complaints, no fever.   patient was supposed to be evaluated for sleep apnea again because it was felt that it was worse but was not done due to insurance issues.  He has had multiple monitors with high volumes of PVCs with his never had further intervention than that for it.      9/29/2024:  No further chest pain.  No recurrence of syncope.  Telemetry shows VPCs and bigeminy but no sustained arrhythmias.  EP consult appreciated.  Cardiac catheterization scheduled for tomorrow for further evaluation for ischemic heart disease.    9/30/24: s/p CC which showed 1 Vessel CAD involving proximal LAD with +FFR and NL LV FX.  Successful PCI of LAD.     PAST MEDICAL & SURGICAL HISTORY:  Frequent PVCs  JANAY (obstructive sleep apnea)  BPH (benign prostatic hyperplasia)  History of appendectomy    SOCIAL HISTORY: Non-Smoker/Social ETOH/ No Ilicit Drug use.    FAMILY HISTORY:  FHx: hypertension (Father, Mother)  Family history of ankylosing spondylitis (Father)    Allergies  sorbitol (Diarrhea)  dairy products (Diarrhea)  penicillin (Unknown)    Home Medications:  albuterol 90 mcg/inh inhalation aerosol: 2 puff(s) inhaled every 4 hours as needed for (27 Sep 2024 21:25)  alfuzosin 10 mg oral tablet, extended release: 1 tab(s) orally once a day (in the afternoon) (27 Sep 2024 21:29)  dutasteride 0.5 mg oral capsule: 1 cap(s) orally once a day (in the afternoon) (27 Sep 2024 21:28)  Motrin 600 mg oral tablet: 1 tab(s) orally 4 times a day as needed for (27 Sep 2024 21:29)  Symbicort 160 mcg-4.5 mcg/inh inhalation aerosol: 2 puff(s) inhaled 2 times a day as needed for (27 Sep 2024 21:29)    EDICATIONS  (PRN):  albuterol    90 MICROgram(s) HFA Inhaler 2 Puff(s) Inhalation every 6 hours PRN Shortness of Breath and/or Wheezing      MEDICATIONS  (STANDING):  aspirin enteric coated 81 milliGRAM(s) Oral daily  atorvastatin 40 milliGRAM(s) Oral at bedtime  finasteride 5 milliGRAM(s) Oral at bedtime  fluticasone propionate/ salmeterol 250-50 MICROgram(s) Diskus 1 Dose(s) Inhalation two times a day  metoprolol succinate ER 25 milliGRAM(s) Oral daily  sodium chloride 0.9%. 1000 milliLiter(s) (70 mL/Hr) IV Continuous <Continuous>  tamsulosin 0.8 milliGRAM(s) Oral at bedtime    MEDICATIONS  (PRN):  albuterol    90 MICROgram(s) HFA Inhaler 2 Puff(s) Inhalation every 6 hours PRN Shortness of Breath and/or Wheezing      Vital Signs Last 24 Hrs  T(C): 36.4 (30 Sep 2024 14:39), Max: 36.8 (29 Sep 2024 21:03)  T(F): 97.6 (30 Sep 2024 14:39), Max: 98.2 (29 Sep 2024 21:03)  HR: 69 (30 Sep 2024 14:39) (60 - 83)  BP: 143/91 (30 Sep 2024 14:39) (120/69 - 150/79)  BP(mean): --  RR: 15 (30 Sep 2024 14:39) (15 - 18)  SpO2: 100% (30 Sep 2024 14:39) (94% - 100%)    Parameters below as of 30 Sep 2024 14:39  Patient On (Oxygen Delivery Method): room air        I&O's Detail    30 Sep 2024 07:01  -  30 Sep 2024 19:48  --------------------------------------------------------  IN:    Oral Fluid: 120 mL    sodium chloride 0.9%: 490 mL  Total IN: 610 mL    OUT:    Voided (mL): 450 mL  Total OUT: 450 mL    Total NET: 160 mL    PHYSICAL EXAM:    Constitutional: NAD, awake and alert, well-developed  HEENT: PERRLA, EOMI,  No oral cyanosis. Oropharynx Clean and Dry.  Neck:  supple,  No JVD, No Thyroid enlargement. No Carotid Bruits bilaterally.  Respiratory: Breath sounds are clear bilaterally, No wheezing, rales or rhonchi  Cardiovascular: NL S1 and S2, RRR,   1/6 systolic ejection murmur, no gallops or rubs  Gastrointestinal: Bowel Sounds present.   Extremities: No peripheral edema. No clubbing or cyanosis.  Vascular: 2+ peripheral pulses in LE   Neurological: A/O x 3,  Musculoskeletal: no calf tenderness  Skin: No rashes or lessions.  right radial access site with hemoband/TR band in place.     LABS: All Labs Reviewed:                        15.2   5.82  )-----------( 174      ( 30 Sep 2024 07:13 )             45.5     09-30    142  |  112[H]  |  22  ----------------------------<  120[H]  4.3   |  24  |  1.42[H]    Ca    9.1      30 Sep 2024 07:13    Troponin: <-8.79, <-11.26    09-28 Chol 228[H] LDL -- HDL 39[L] Trig 160[H]    RADIOLOGY:    < from: CT Angio Chest PE Protocol w/ IV Cont (09.27.24 @ 17:05) >  IMPRESSION:  No acute pulmonary embolism    Trace bilateral pleural effusions with adjacent atelectasis    < end of copied text >  < from: Xray Chest 1 View- PORTABLE-Urgent (09.27.24 @ 15:36) >    IMPRESSION: Clear lungs at this time.    < end of copied text >    EKG:    < from: 12 Lead ECG (09.27.24 @ 14:17) >   Sinus rhythm with frequent and consecutive Premature ventricular complexes  Possible Inferior infarct , age undetermined  Abnormal ECG    < end of copied text >  ECHO:    < from: TTE W or WO Ultrasound Enhancing Agent (09.28.24 @ 08:14) >  1. Left ventricular cavity is normal in size. Left ventricular systolic function is low normal with an ejection fraction visually estimated at 50 to 55 %. Regional wall motion abnormalities present.   2. Mild left ventricular hypertrophy.   3. Basal and midanterolateral wall, basal inferoseptal segment, and apical lateral segment are abnormal. However, accuracy is limited due to frequent PVC's.   4. Basal and mid anterolateral wall, basal inferoseptal segment, and apical lateral segment are abnormal.   5. Normal left ventricular diastolic function.   6. Left atrium is normal in size.   7. The right atrium is normal in size.   8. Normal right ventricular cavity size, with normal wall thickness, and normal right ventricular systolic function.   9. Structurally normal mitral valve with normal leaflet excursion.  10. Structurally normal tricuspid valve with normal leaflet excursion.  11. Trileaflet aortic valve with normal systolic excursion.  12. The interatrial septum appears intact.  13. No pericardial effusion seen.  < end of copied text >      CC:  1 vessel CAD with significant LAD disease FFR 0.68.  NL LV FX. IVUS guided PCI of LAD successful.

## 2024-10-01 ENCOUNTER — TRANSCRIPTION ENCOUNTER (OUTPATIENT)
Age: 59
End: 2024-10-01

## 2024-10-01 VITALS
RESPIRATION RATE: 17 BRPM | HEART RATE: 88 BPM | TEMPERATURE: 98 F | OXYGEN SATURATION: 98 % | SYSTOLIC BLOOD PRESSURE: 148 MMHG | DIASTOLIC BLOOD PRESSURE: 80 MMHG

## 2024-10-01 PROBLEM — N40.0 BENIGN PROSTATIC HYPERPLASIA WITHOUT LOWER URINARY TRACT SYMPTOMS: Chronic | Status: ACTIVE | Noted: 2024-09-28

## 2024-10-01 PROBLEM — G47.33 OBSTRUCTIVE SLEEP APNEA (ADULT) (PEDIATRIC): Chronic | Status: ACTIVE | Noted: 2024-09-28

## 2024-10-01 LAB
ANION GAP SERPL CALC-SCNC: 5 MMOL/L — SIGNIFICANT CHANGE UP (ref 5–17)
ANION GAP SERPL CALC-SCNC: 5 MMOL/L — SIGNIFICANT CHANGE UP (ref 5–17)
BASOPHILS # BLD AUTO: 0.05 K/UL — SIGNIFICANT CHANGE UP (ref 0–0.2)
BASOPHILS NFR BLD AUTO: 0.7 % — SIGNIFICANT CHANGE UP (ref 0–2)
BUN SERPL-MCNC: 18 MG/DL — SIGNIFICANT CHANGE UP (ref 7–23)
BUN SERPL-MCNC: 19 MG/DL — SIGNIFICANT CHANGE UP (ref 7–23)
CALCIUM SERPL-MCNC: 8.6 MG/DL — SIGNIFICANT CHANGE UP (ref 8.5–10.1)
CALCIUM SERPL-MCNC: 8.7 MG/DL — SIGNIFICANT CHANGE UP (ref 8.5–10.1)
CHLORIDE SERPL-SCNC: 110 MMOL/L — HIGH (ref 96–108)
CHLORIDE SERPL-SCNC: 113 MMOL/L — HIGH (ref 96–108)
CO2 SERPL-SCNC: 23 MMOL/L — SIGNIFICANT CHANGE UP (ref 22–31)
CO2 SERPL-SCNC: 26 MMOL/L — SIGNIFICANT CHANGE UP (ref 22–31)
CREAT SERPL-MCNC: 1.42 MG/DL — HIGH (ref 0.5–1.3)
CREAT SERPL-MCNC: 1.56 MG/DL — HIGH (ref 0.5–1.3)
EGFR: 52 ML/MIN/1.73M2 — LOW
EGFR: 58 ML/MIN/1.73M2 — LOW
EOSINOPHIL # BLD AUTO: 0.14 K/UL — SIGNIFICANT CHANGE UP (ref 0–0.5)
EOSINOPHIL NFR BLD AUTO: 2 % — SIGNIFICANT CHANGE UP (ref 0–6)
GLUCOSE BLDC GLUCOMTR-MCNC: 100 MG/DL — HIGH (ref 70–99)
GLUCOSE SERPL-MCNC: 110 MG/DL — HIGH (ref 70–99)
GLUCOSE SERPL-MCNC: 111 MG/DL — HIGH (ref 70–99)
HCT VFR BLD CALC: 44.2 % — SIGNIFICANT CHANGE UP (ref 39–50)
HGB BLD-MCNC: 14.8 G/DL — SIGNIFICANT CHANGE UP (ref 13–17)
IMM GRANULOCYTES NFR BLD AUTO: 0.4 % — SIGNIFICANT CHANGE UP (ref 0–0.9)
LYMPHOCYTES # BLD AUTO: 1.19 K/UL — SIGNIFICANT CHANGE UP (ref 1–3.3)
LYMPHOCYTES # BLD AUTO: 17 % — SIGNIFICANT CHANGE UP (ref 13–44)
MCHC RBC-ENTMCNC: 32.1 PG — SIGNIFICANT CHANGE UP (ref 27–34)
MCHC RBC-ENTMCNC: 33.5 GM/DL — SIGNIFICANT CHANGE UP (ref 32–36)
MCV RBC AUTO: 95.9 FL — SIGNIFICANT CHANGE UP (ref 80–100)
MONOCYTES # BLD AUTO: 0.55 K/UL — SIGNIFICANT CHANGE UP (ref 0–0.9)
MONOCYTES NFR BLD AUTO: 7.9 % — SIGNIFICANT CHANGE UP (ref 2–14)
NEUTROPHILS # BLD AUTO: 5.04 K/UL — SIGNIFICANT CHANGE UP (ref 1.8–7.4)
NEUTROPHILS NFR BLD AUTO: 72 % — SIGNIFICANT CHANGE UP (ref 43–77)
PLATELET # BLD AUTO: 179 K/UL — SIGNIFICANT CHANGE UP (ref 150–400)
POTASSIUM SERPL-MCNC: 3.4 MMOL/L — LOW (ref 3.5–5.3)
POTASSIUM SERPL-MCNC: 4.3 MMOL/L — SIGNIFICANT CHANGE UP (ref 3.5–5.3)
POTASSIUM SERPL-SCNC: 3.4 MMOL/L — LOW (ref 3.5–5.3)
POTASSIUM SERPL-SCNC: 4.3 MMOL/L — SIGNIFICANT CHANGE UP (ref 3.5–5.3)
RBC # BLD: 4.61 M/UL — SIGNIFICANT CHANGE UP (ref 4.2–5.8)
RBC # FLD: 12.4 % — SIGNIFICANT CHANGE UP (ref 10.3–14.5)
SODIUM SERPL-SCNC: 141 MMOL/L — SIGNIFICANT CHANGE UP (ref 135–145)
SODIUM SERPL-SCNC: 141 MMOL/L — SIGNIFICANT CHANGE UP (ref 135–145)
WBC # BLD: 7 K/UL — SIGNIFICANT CHANGE UP (ref 3.8–10.5)
WBC # FLD AUTO: 7 K/UL — SIGNIFICANT CHANGE UP (ref 3.8–10.5)

## 2024-10-01 PROCEDURE — 99239 HOSP IP/OBS DSCHRG MGMT >30: CPT

## 2024-10-01 PROCEDURE — 99233 SBSQ HOSP IP/OBS HIGH 50: CPT | Mod: 25

## 2024-10-01 PROCEDURE — 93010 ELECTROCARDIOGRAM REPORT: CPT

## 2024-10-01 PROCEDURE — 33285 INSJ SUBQ CAR RHYTHM MNTR: CPT

## 2024-10-01 RX ORDER — ATORVASTATIN CALCIUM 10 MG/1
1 TABLET, FILM COATED ORAL
Qty: 30 | Refills: 0
Start: 2024-10-01

## 2024-10-01 RX ORDER — BUDESONIDE AND FORMOTEROL FUMARATE DIHYDRATE 80; 4.5 UG/1; UG/1
2 AEROSOL RESPIRATORY (INHALATION)
Qty: 0 | Refills: 0 | DISCHARGE

## 2024-10-01 RX ORDER — METOPROLOL TARTRATE 50 MG
1 TABLET ORAL
Qty: 30 | Refills: 0
Start: 2024-10-01

## 2024-10-01 RX ORDER — PRASUGREL 5 MG/1
1 TABLET, FILM COATED ORAL
Qty: 30 | Refills: 0
Start: 2024-10-01

## 2024-10-01 RX ORDER — ASPIRIN 325 MG
1 TABLET ORAL
Qty: 30 | Refills: 0
Start: 2024-10-01

## 2024-10-01 RX ADMIN — Medication 20 MILLIEQUIVALENT(S): at 01:37

## 2024-10-01 RX ADMIN — Medication 1 DOSE(S): at 07:57

## 2024-10-01 RX ADMIN — Medication 25 MILLIGRAM(S): at 09:30

## 2024-10-01 RX ADMIN — PRASUGREL 10 MILLIGRAM(S): 5 TABLET, FILM COATED ORAL at 09:29

## 2024-10-01 RX ADMIN — Medication 81 MILLIGRAM(S): at 09:30

## 2024-10-01 NOTE — PROGRESS NOTE ADULT - ASSESSMENT
Presyncope with prodrome in the setting of a low longstanding history of large volume PVCs with sleep apnea likely more severe than mild.   echocardiogram with wall motion abnormalities and mild drop in ejection fraction.  Further evaluation with cardiac catheterization and EP consultation for the possibility of an EP study verses implantable loop recorder for further evaluation of syncope.  Will need more evaluation for sleep apnea as probably a contributing factor as well.        9/29/2024:  Cardiac catheterization scheduled for further evaluation for ischemic heart disease.  We will start hydration later today in preparation for procedure tomorrow.  EP consult appreciated.  Further recommendations based on results of catheterization.    9/30/24:  s/p PCI of LAD.  Started on ASA and Effient.  EP FU tomorrow .  Continue on Metoprolol and Atorvastatin.  DC pending EP input.     10/1/24: s/p PCI of LAD.  Awaiting EP fu and likely DC after that.      case discussed with patient, and staff

## 2024-10-01 NOTE — PROGRESS NOTE ADULT - ASSESSMENT
ASSESSMENT/PLAN: 	  57 yo male with PMHx  recent Pneumonia ( Aug 2024) BPH, loose stools associated with IBS, Obesity , JANAY  currently using a oral appliance, and PVCs( extensive workup in past including multiple event monitors and CT angiogram of coronary arteries), who presented to [place] ED c/o 2 episodes of dizziness, diaphoresis and palpitations with chest pain/pressure 2/10. Presyncope with prodrome in the setting of a low longstanding history of large volume PVCs with sleep apnea likely more severe than mild. Echocardiogram with wall motion abnormalities and mild drop in ejection fraction.  Further evaluation with cardiac catheterization and EP consultation for the possibility of an EP study verses implantable loop recorder for further evaluation of syncope.  S/P [facility] S/P CAROLYN   EP consult noted would not consider an EP study without a planned catheter ablation and the patient does not wish to proceed with ablation at this time. He is agreeable to ILR implant for further rhythm surveillance.      -Encourage PO fluids  -ASA 81mg  -Effient 10mg   -Lipitor 40mg   -Toprol XL 25mg   Awaiting EP fu and likely DC after that.  -Plan of care D/W pt. and MD  -Discussed therapeutic lifestyle changes to reduce risk factors such as following a cardiac diet, weight loss, maintaining a healthy weight, exercise, smoking cessation, medication compliance, and regular follow-up  with MD to know our numbers (BP, cholesterol, weight, and glucose  -Follow-up with attending/cardiologist

## 2024-10-01 NOTE — PROCEDURAL SAFETY CHECKLIST WITH OR WITHOUT SEDATION - NSPOSTCOMMENTFT_GEN_ALL_CORE
pt prepped for the procedure as per protocol, all team members present and identifies, brief and time out completed, loop recorder inserted, programmed, incision closed with sutures strips and Dermabond glue, wound class 1, pt tolerated procedure well, safety and comfort maintained.

## 2024-10-01 NOTE — PROGRESS NOTE ADULT - REASON FOR ADMISSION
Presyncope  Light headed  chest pressure  Freq PVCs
Presyncope  Light headedness  chest pressure  Freq PVCs

## 2024-10-01 NOTE — DISCHARGE NOTE PROVIDER - CARE PROVIDERS DIRECT ADDRESSES
[email or web address]

## 2024-10-01 NOTE — PROGRESS NOTE ADULT - ASSESSMENT
ASSESSMENT & PLAN: This is a 58-year-old gentleman with history of PVCs, sleep apnea, a recent CT angio with mild coronary disease who presents with an episode of near syncope.  This was 1 of 2 episodes that occurred on the day of admission both occurred in a standing position with a prodrome of lightheadedness dizziness and chest discomfort.  He denies any palpitations or tachycardia.  She did not lose consciousness and maintain postural tone. Clinical history sounds like a vasovagal origin of his symptoms.      Presyncope of unclear origin ECG on presentation demonstrated frequent RVOT morphology PVCs.  s/p cardiac catheterization last evening, 1 vessel CAD s/p CAROLYN to LAD  Would not consider an EP study without a planned catheter ablation and the patient does not wish to proceed with ablation at this time.  He is agreeable to ILR implant for further rhythm surveillance.  Will plan procedure for this afternoon prior to discharge.  Continue BB, DAPT, statin  Plan d/w patient. RN, Hospitalist MD and Dr. Mejia.

## 2024-10-01 NOTE — DISCHARGE NOTE PROVIDER - HOSPITAL COURSE
59 yo man with JANAY, hx of PVCs, IBS, BPH, presented for near syncope, constellation of diaphoresis, palpitations, chest pressure and feeling faint. Serial troponin was negative. EKG/tele showed SR, PVCs with bigeminy. Echocardiogram demonstrated wall motion abnormalities and mild drop in ejection fraction. LHC was performed and found 1 vessel CAD to proximal LAD for which he received a drug eluting stent. EP was also consulted to evaluate his near syncope and PVC burden. EP consult noted that they would not consider an EP study without a planned catheter ablation and the patient does not wish to proceed with ablation at this time. Instead, he was agreeable to ILR implant for further rhythm surveillance. Now stable for discharge home.     Near syncope  Likely due to 1 vessel CAD, now s/p LHC with CAROLYN to LAD. Patient feeling improved. Stable for discharge. ILR placement to monitor rhythm. Outpatient follow up with Cardiology Dr Garcia and EP Dr Mejia.     1 vessel CAD  S/p CAROLYN to prox LAD. Continue Effient and ASA. Continue statin and metoprolol. Follow up with Cardiology as outpatient.     PVCs  Appreciate Cardiac EP input. For ILR to assess PVC burden, rhythm monitoring. Outpatient follow up with EP    JANAY  Continue oral appliance.     Discharge Exam:  T(F): 98.2 (01 Oct 2024 07:53), Max: 98.5 (30 Sep 2024 19:49)  HR: 76 (01 Oct 2024 08:01) (58 - 79)  BP: 138/79 (01 Oct 2024 07:53) (118/102 - 162/113)  RR: 18 (01 Oct 2024 07:53) (11 - 22)  SpO2: 94% (01 Oct 2024 07:53) (94% - 100%) on room air  Gen: Comfortable  HEENT: NCAT PERRL EOMI MMM clear oropharynx  Neck: Supple, no JVD, no LAD, no thyromegaly  CVS: s1 s2 normal. RRR  Chest: Normal resp effort, lungs CTA B/L  Abd: +BS, soft NT ND   Ext: No edema, no tenderness  Skin: Warm, dry  Mood: Calm, pleasant  Neuro: A+OX3, no deficits    Labs:                        14.8   7.00  )----------( 179                   44.2       141  |  113  |  19  ----------------------<  110  4.3   |  23  |  1.42    Ca    8.7         Serial troponin negative     Imaging:  CTA chest 9/27: Patent central airways. Mild dependent atelectasis. Calcified granulomata. Trace bilateral pleural effusions. Aortic calcifications. Coronary artery calcifications. No acute pulmonary embolism. Heart size is normal. No pericardial effusion. No lymphadenopathy. Minimal bilateral gynecomastia. Incidental cholelithiasis. Hepatic steatosis with fat sparing adjacent to gallbladder fossa. Multilevel degenerative changes throughout the spine.    Cardiac Testing:  [facility] 9/30: 1 vessel CAD involving LAD with + FFR and NL LV function. A successful intervention of the LAD was performed.    EKG 9/30: Rate 74. NSR    TTE 9/28: Left ventricular cavity is normal in size. Left ventricular systolic function is low normal with an ejection fraction visually estimated at 50 to 55 %. Regional wall motion abnormalities present. Mild left ventricular hypertrophy. Basal and midanterolateral wall, basal inferoseptal segment, and apical lateral segment are abnormal. However, accuracy is limited due to frequent PVC's. Basal and mid anterolateral wall, basal inferoseptal segment, and apical lateral segment are abnormal. Normal left ventricular diastolic function. Left atrium is normal in size. The right atrium is normal in size. Normal right ventricular cavity size, with normal wall thickness, and normal right ventricular systolic function. Structurally normal mitral valve with normal leaflet excursion. Structurally normal tricuspid valve with normal leaflet excursion. Trileaflet aortic valve with normal systolic excursion. The interatrial septum appears intact. No pericardial effusion seen.    EKG 9/28: Rate 61. NSR.     EKG 9/27: Rate 86. Sinus rhythm with frequent and consecutive premature ventricular complexes.

## 2024-10-01 NOTE — PROGRESS NOTE ADULT - SUBJECTIVE AND OBJECTIVE BOX
HPI:  59 yo male with PMHx  recent Pneumonia ( Aug 2024) BPH, loose stools associated with IBS, Obesity , JANAY  currently using a oral appliance, and PVCs( extensive workup in past including multiple event monitors and CT angiogram of coronary arteries), who presented to [place] ED c/o 2 episodes of dizziness, diaphoresis and palpitations with chest pain/pressure 2/10.  Pt was walking from his office to the nearby pharmacy when he felt light headed and nearly fainted.   His wife took him home and he ate something.  However he still felt "hazy and tired."   Pt had a crown placed on a broken tooth yesterday,  he had received novocaine and took 600mg motrin. after going home he came back to the drugstore again to pick something up and had a 2nd event and that prompted the emergency room visit.  The 2nd event also had a prodrome and presyncopal.  He reports occas cramping discomfort in his back and chest right ribs.   He denies SOB,  no n/v/ d,  no urinary complaints,  no edema/calf pain,  no URI complaints, no fever.   patient was supposed to be evaluated for sleep apnea again because it was felt that it was worse but was not done due to insurance issues.  He has had multiple monitors with high volumes of PVCs with his never had further intervention than that for it.      9/29/2024:  No further chest pain.  No recurrence of syncope.  Telemetry shows VPCs and bigeminy but no sustained arrhythmias.  EP consult appreciated.  Cardiac catheterization scheduled for tomorrow for further evaluation for ischemic heart disease.    9/30/24: s/p CC which showed 1 Vessel CAD involving proximal LAD with +FFR and NL LV FX.  Successful PCI of LAD.     10/1/24: No chest pain overnight.  Awaiting EP follow up.  Sweaty during night but not this AM.        PAST MEDICAL & SURGICAL HISTORY:  Frequent PVCs  JANAY (obstructive sleep apnea)  BPH (benign prostatic hyperplasia)  History of appendectomy    SOCIAL HISTORY: Non-Smoker/Social ETOH/ No Ilicit Drug use.    FAMILY HISTORY:  FHx: hypertension (Father, Mother)  Family history of ankylosing spondylitis (Father)    Allergies  sorbitol (Diarrhea)  dairy products (Diarrhea)  penicillin (Unknown)    Home Medications:  albuterol 90 mcg/inh inhalation aerosol: 2 puff(s) inhaled every 4 hours as needed for (27 Sep 2024 21:25)  alfuzosin 10 mg oral tablet, extended release: 1 tab(s) orally once a day (in the afternoon) (27 Sep 2024 21:29)  dutasteride 0.5 mg oral capsule: 1 cap(s) orally once a day (in the afternoon) (27 Sep 2024 21:28)  Motrin 600 mg oral tablet: 1 tab(s) orally 4 times a day as needed for (27 Sep 2024 21:29)  Symbicort 160 mcg-4.5 mcg/inh inhalation aerosol: 2 puff(s) inhaled 2 times a day as needed for (27 Sep 2024 21:29)    EDICATIONS  (PRN):  albuterol    90 MICROgram(s) HFA Inhaler 2 Puff(s) Inhalation every 6 hours PRN Shortness of Breath and/or Wheezing      MEDICATIONS  (STANDING):  aspirin enteric coated 81 milliGRAM(s) Oral daily  atorvastatin 40 milliGRAM(s) Oral at bedtime  finasteride 5 milliGRAM(s) Oral at bedtime  fluticasone propionate/ salmeterol 250-50 MICROgram(s) Diskus 1 Dose(s) Inhalation two times a day  metoprolol succinate ER 25 milliGRAM(s) Oral daily  prasugrel 10 milliGRAM(s) Oral daily  sodium chloride 0.9%. 1000 milliLiter(s) (70 mL/Hr) IV Continuous <Continuous>  tamsulosin 0.8 milliGRAM(s) Oral at bedtime    MEDICATIONS  (PRN):  albuterol    90 MICROgram(s) HFA Inhaler 2 Puff(s) Inhalation every 6 hours PRN Shortness of Breath and/or Wheezing      Vital Signs Last 24 Hrs  T(C): 36.8 (01 Oct 2024 07:53), Max: 36.9 (30 Sep 2024 19:49)  T(F): 98.2 (01 Oct 2024 07:53), Max: 98.5 (30 Sep 2024 19:49)  HR: 76 (01 Oct 2024 08:01) (58 - 79)  BP: 138/79 (01 Oct 2024 07:53) (118/102 - 162/113)  BP(mean): 89 (30 Sep 2024 23:53) (89 - 140)  RR: 18 (01 Oct 2024 07:53) (11 - 22)  SpO2: 94% (01 Oct 2024 07:53) (94% - 100%)    Parameters below as of 01 Oct 2024 07:53  Patient On (Oxygen Delivery Method): room air        I&O's Detail    30 Sep 2024 07:01  -  01 Oct 2024 07:00  --------------------------------------------------------  IN:    Oral Fluid: 120 mL    sodium chloride 0.9%: 490 mL  Total IN: 610 mL    OUT:    Voided (mL): 450 mL  Total OUT: 450 mL    Total NET: 160 mL          Daily     Daily     PHYSICAL EXAM:    Constitutional: NAD, awake and alert, well-developed  HEENT: PERRLA, EOMI,  No oral cyanosis. Oropharynx Clean and Dry.  Neck:  supple,  No JVD, No Thyroid enlargement. No Carotid Bruits bilaterally.  Respiratory: Breath sounds are clear bilaterally, No wheezing, rales or rhonchi  Cardiovascular: NL S1 and S2, RRR,   1/6 systolic ejection murmur, no gallops or rubs  Gastrointestinal: Bowel Sounds present.   Extremities: No peripheral edema. No clubbing or cyanosis.  Vascular: 2+ peripheral pulses in LE   Neurological: A/O x 3,  Musculoskeletal: no calf tenderness  Skin: No rashes or lessions.  right radial access site with good pulse.  No bruit or hematoma.     LABS: All Labs Reviewed:                        15.2   5.82  )-----------( 174      ( 30 Sep 2024 07:13 )             45.5     09-30    142  |  112[H]  |  22  ----------------------------<  120[H]  4.3   |  24  |  1.42[H]    Ca    9.1      30 Sep 2024 07:13    Troponin: <-8.79, <-11.26    09-28 Chol 228[H] LDL -- HDL 39[L] Trig 160[H]    RADIOLOGY:    < from: CT Angio Chest PE Protocol w/ IV Cont (09.27.24 @ 17:05) >  IMPRESSION:  No acute pulmonary embolism    Trace bilateral pleural effusions with adjacent atelectasis    < end of copied text >  < from: Xray Chest 1 View- PORTABLE-Urgent (09.27.24 @ 15:36) >    IMPRESSION: Clear lungs at this time.    < end of copied text >    EKG:    < from: 12 Lead ECG (09.27.24 @ 14:17) >   Sinus rhythm with frequent and consecutive Premature ventricular complexes  Possible Inferior infarct , age undetermined  Abnormal ECG    < end of copied text >  ECHO:    < from: TTE W or WO Ultrasound Enhancing Agent (09.28.24 @ 08:14) >  1. Left ventricular cavity is normal in size. Left ventricular systolic function is low normal with an ejection fraction visually estimated at 50 to 55 %. Regional wall motion abnormalities present.   2. Mild left ventricular hypertrophy.   3. Basal and midanterolateral wall, basal inferoseptal segment, and apical lateral segment are abnormal. However, accuracy is limited due to frequent PVC's.   4. Basal and mid anterolateral wall, basal inferoseptal segment, and apical lateral segment are abnormal.   5. Normal left ventricular diastolic function.   6. Left atrium is normal in size.   7. The right atrium is normal in size.   8. Normal right ventricular cavity size, with normal wall thickness, and normal right ventricular systolic function.   9. Structurally normal mitral valve with normal leaflet excursion.  10. Structurally normal tricuspid valve with normal leaflet excursion.  11. Trileaflet aortic valve with normal systolic excursion.  12. The interatrial septum appears intact.  13. No pericardial effusion seen.  < end of copied text >      CC:  1 vessel CAD with significant LAD disease FFR 0.68.  NL LV FX. IVUS guided PCI of LAD successful.

## 2024-10-01 NOTE — DISCHARGE NOTE PROVIDER - NSDCCPCAREPLAN_GEN_ALL_CORE_FT
PRINCIPAL DISCHARGE DIAGNOSIS  Diagnosis: Near syncope  Assessment and Plan of Treatment: You were admitted to the hospital for further evaluation after episodes of near-syncope, palpitations, sweats, chest discomfort, feeling faint. You were found to have wall motion abnormalities of the heart on echocardiogram. A left heart catheterization (coronary angiogram) was performed and found 1-vessel occlusive coronary artery disease for which she recieved a drug eluting stent placement. You were also noted this admission to have premature ventricular contractions (PVC) on cardiac monitoring. Loop recorder placed to assess for PVC burden and further management to follow as outpatient. Follow up with Cardiology Dr Garcia and Electrophysiology Dr Mejia. Continue medications as prescribed.      SECONDARY DISCHARGE DIAGNOSES  Diagnosis: CAD (coronary artery disease)  Assessment and Plan of Treatment: 1-vessel CAD noted on angiogram this admission. You received successful drug eluting coronary stent placement to the proximal left anterior descending coronary artery. Continue on prasugrel and aspirin. Continue statin and metoprolol. Follow up with Cardiology as outpatient.    Diagnosis: Frequent PVCs  Assessment and Plan of Treatment: You were seen and evaluated by Electrophysiology to assess your issue of premature ventricular contractions. Loop recorder placed to assess PVC burden, provide rhythm monitoring. Outpatient follow up with Electrophysiology for next steps in management.

## 2024-10-01 NOTE — DISCHARGE NOTE PROVIDER - NSDCMRMEDTOKEN_GEN_ALL_CORE_FT
albuterol 90 mcg/inh inhalation aerosol: 2 puff(s) inhaled every 4 hours as needed for  alfuzosin 10 mg oral tablet, extended release: 1 tab(s) orally once a day (in the afternoon)  aspirin 81 mg oral delayed release tablet: 1 tab(s) orally once a day  atorvastatin 40 mg oral tablet: 1 tab(s) orally once a day (at bedtime)  dutasteride 0.5 mg oral capsule: 1 cap(s) orally once a day (in the afternoon)  metoprolol succinate 25 mg oral tablet, extended release: 1 tab(s) orally once a day  prasugrel 10 mg oral tablet: 1 tab(s) orally once a day  Symbicort 160 mcg-4.5 mcg/inh inhalation aerosol: 2 puff(s) inhaled 2 times a day Rise mouth out after each use

## 2024-10-01 NOTE — DISCHARGE NOTE PROVIDER - CARE PROVIDER_API CALL
Hudson Garcia  Interventional Cardiology  [address] [place] [address], [place] [address]  Phone: [phone]  Fax: [phone]  Follow Up Time: 2 weeks    Jimmie Mejia  Cardiac Electrophysiology  [address] [place] [id number]  Phone: [phone]  Fax: [phone]  Follow Up Time: 2 weeks

## 2024-10-01 NOTE — DISCHARGE NOTE PROVIDER - NSDCCAREPROVSEEN_GEN_ALL_CORE_FT
Fara Noel (Cardiac Electrophysiology)  Hudson Garcia (Cardiology, Interventional Cardiology)  Lalo Mcdonough (Medicine)  Bria Ramos (Medicine)  Jimmie Mejia (Cardiac Electrophysiology)  Shae Pompa (Interventional Cardiology)  Ruth Kriby (Interventional Cardiology)  Aashish King (Medicine)

## 2024-10-01 NOTE — PROGRESS NOTE ADULT - SUBJECTIVE AND OBJECTIVE BOX
HPI:  59 yo male with PMHx  recent Pneumonia ( Aug 2024) BPH, loose stools associated with IBS, Obesity , JANAY  currently using a oral appliance, and PVCs( extensive workup in past including multiple event monitors and CT angiogram of coronary arteries), who presented to [facility] ED c/o 2 episodes of dizziness, diaphoresis and palpitations with chest pain/pressure 2/10.  Pt was walking from his office to the nearby pharmacy when he felt light headed and nearly fainted.   His wife took him home and he ate something.  However he still felt "hazy and tired."   Pt had a crown placed on a broken tooth yesterday,  he had received novocaine and took 600mg motrin. after going home he came back to the drugstore again to pick something up and had a 2nd event and that prompted the emergency room visit.  The 2nd event also had a prodrome and presyncopal.  He reports occasional  cramping discomfort in his back and chest right ribs.   He denies SOB,  no n/v/ d,  no urinary complaints,  no edema/calf pain,  no URI complaints, no fever. Patient was supposed to be evaluated for sleep apnea again because it was felt that it was worse but was not done due to insurance issues.  He has had multiple monitors with high volumes of PVCs with his never had further intervention than that for it.    Pt. referred for [facility] for further evaluation     Subjective/Observations: Pt. seen and examined and evaluated. Pt. resting comfortably in bed in NAD, with no respiratory distress, no chest pain, dyspnea, palpitations, PND, or orthopnea.    REVIEW OF SYSTEMS: All other review of systems is negative unless indicated above    PAST MEDICAL & SURGICAL HISTORY:  Frequent PVC  JANAY (obstructive sleep apnea)  BPH (benign prostatic hyperplasia)  History of appendectomy    MEDICATIONS  (STANDING):  aspirin enteric coated 81 milliGRAM(s) Oral daily  atorvastatin 40 milliGRAM(s) Oral at bedtime  finasteride 5 milliGRAM(s) Oral at bedtime  fluticasone propionate/ salmeterol 250-50 MICROgram(s) Diskus 1 Dose(s) Inhalation two times a day  metoprolol succinate ER 25 milliGRAM(s) Oral daily  prasugrel 10 milliGRAM(s) Oral daily  sodium chloride 0.9%. 1000 milliLiter(s) (70 mL/Hr) IV Continuous <Continuous>  tamsulosin 0.8 milliGRAM(s) Oral at bedtime    MEDICATIONS  (PRN):  albuterol    90 MICROgram(s) HFA Inhaler 2 Puff(s) Inhalation every 6 hours PRN Shortness of Breath and/or Wheezing    Allergies:  sorbitol (Diarrhea)  dairy products (Diarrhea)  penicillin (Unknown)  Vital Signs Last 24 Hrs  T(C): 36.8 (01 Oct 2024 07:53), Max: 36.9 (30 Sep 2024 19:49)  T(F): 98.2 (01 Oct 2024 07:53), Max: 98.5 (30 Sep 2024 19:49)  HR: 76 (01 Oct 2024 08:01) (58 - 79)  BP: 138/79 (01 Oct 2024 07:53) (118/102 - 162/113)  BP(mean): 89 (30 Sep 2024 23:53) (89 - 140)  RR: 18 (01 Oct 2024 07:53) (11 - 22)  SpO2: 94% (01 Oct 2024 07:53) (94% - 100%)      30 Sep 2024 07:01  -  01 Oct 2024 07:00  --------------------------------------------------------  IN: 610 mL / OUT: 450 mL / NET: 160 mL      LABS: All Labs Reviewed:                        14.8   7.00  )-----------( 179      ( 01 Oct 2024 06:54 )              01 Oct 2024 06:54    141    |  113    |  19     ----------------------------<  110    4.3     |  23     |  1.42   01 Oct 2024 00:39    141    |  110    |  18     ----------------------------<  111    3.4     |  26     |  1.56     Ca    8.7        01 Oct 2024 06:54  Ca    8.6        01 Oct 2024 00:39      Cath:  < from: Cardiac Catheterization (09.30.24 @ 18:13) >    Conclusions:   1 vessel CAD involving LAD with + FFR and NL LV FX..A successful  intervention of the LAD was performed.  Recommendations:   Patient management should include aggressive medical therapy and an  exercise program  EP FU regarding syncope.     EKG: NSR @ 66 BPM     Interpretation of Telemetry: Overnight on telemetry SR 60-80 PVC's AIVR       Physical Exam:  Appearance: [ ] Normal  [ ] abnormal [X ] NAD   Eyes: [ ] PERRL [ ] EOMI  HEENT: [ ] Normal [ ] Abnormal oral mucosa [ ]NC/AT  Cardiovascular: [X ] S1 [X ] S2 [ ] RRR [ ] m/r/g [ ]edema [ ] JVP  Procedural Access Site: Rt. radial dressing removed site benign soft no bleeding no hematoma +1 radial pulse no c/o numbness/tingling, <3sec cap refill, fingers/hand warm to touch   Respiratory: X ] Clear to auscultation bilaterally  Gastrointestinal: [ ] Soft [ ] tenderness[ ] distension [ ] BS  Musculoskeletal: [ ] clubbing [ ] joint deformity   Neurologic: [ ] Non-focal  Lymphatic: [ ] lymphadenopathy  Psychiatry: [X ] AAOx3  [ ] confused [ ] disoriented [ ] Mood & affect appropriate  Skin: [ ]  rashes [ ] ecchymoses [ ] cyanosis

## 2024-10-01 NOTE — DISCHARGE NOTE PROVIDER - PROVIDER TOKENS
PROVIDER:[TOKEN:[[id number]],FOLLOWUP:[2 weeks]],PROVIDER:[TOKEN:[[id number]],FOLLOWUP:[2 weeks]]

## 2024-10-01 NOTE — DISCHARGE NOTE PROVIDER - DISCHARGE SERVICE FOR PATIENT
on the discharge service for the patient. I have reviewed and made amendments to the documentation where necessary. 06-Mar-2024 01:50

## 2024-10-01 NOTE — PROGRESS NOTE ADULT - PROVIDER SPECIALTY LIST ADULT
Hospitalist
Intervent Cardiology
Cardiology
Intervent Cardiology
Cardiology
Electrophysiology
Hospitalist
Hospitalist
Cardiology
Electrophysiology

## 2024-10-01 NOTE — DISCHARGE NOTE NURSING/CASE MANAGEMENT/SOCIAL WORK - NSDCPEFALRISK_GEN_ALL_CORE
For information on Fall & Injury Prevention, visit: [email or web address] OR  [email or web address] OR  https://www.cdc.gov/steadi/patient.html

## 2024-10-01 NOTE — PROGRESS NOTE ADULT - SUBJECTIVE AND OBJECTIVE BOX
Patient is a 58y old  Male who presents with a chief complaint of Presyncope, Light headedness, chest pressure, Freq PVCs       HPI:  Pt is a pleasant 58 year old man with recent Pneumonia ( Aug 2024) BPH, loose stools associated with IBS,  Obesity , untreated JANAY and PVCs,  who presented to [facility] ED c/o 2 episodes of dizziness, diaphoresis and palpitations with chest pain/pressure 2/10.  His symptoms  occured  at 12: 30 pm today.  Pt was walking from his office to the nearby pharmacy when he felt light headed and nearly fainted.   His wife took him home and he ate something.  However he still felt "hazy and tired."     Pt had a crown placed on a broken tooth yesterday,  he had received novocaine and took 600mg motrin.    He reports occas cramping discomfort in his back and chest right ribs.   He  denies SOB,  no n/v/ d,  no urinary complaints,  no edema/calf pain,  no URI complaints, no fever .  Pt drank 16 oz of coffee this AM and reports he does also drink soda with caffeine.     For a prior history of PVCs pt has seen  an EP doctor/ cardiologist from [place] ( Dr. NARENDRA Ayala) .       EC/28 :Sinus 61 bpm, Low voltage limb leads.    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     24:  TELE: Patient with pretty significant PVC burden, intermittently in Ventricular bigeminy. Had about 2-3 episodes of NSVT on 24 around 10 pm.     10/1/24: s/p LHC last evening which showed 1 Vessel CAD involving proximal LAD with +FFR and NL LV FX.  Successful PCI of LAD. Pt seen sitting OOB in chait having breakfast this am, he denies any CP, SOB, dizziness or palpitations.  He is agreeable to ILR placement this afternoon.  TELE: SR 70-90 bpm, ventricular bigeminy, frequent PVCs        MEDICATIONS  (STANDING):  aspirin enteric coated 81 milliGRAM(s) Oral daily  atorvastatin 40 milliGRAM(s) Oral at bedtime  finasteride 5 milliGRAM(s) Oral at bedtime  fluticasone propionate/ salmeterol 250-50 MICROgram(s) Diskus 1 Dose(s) Inhalation two times a day  metoprolol succinate ER 25 milliGRAM(s) Oral daily  prasugrel 10 milliGRAM(s) Oral daily  sodium chloride 0.9%. 1000 milliLiter(s) (70 mL/Hr) IV Continuous <Continuous>  tamsulosin 0.8 milliGRAM(s) Oral at bedtime    MEDICATIONS  (PRN):  albuterol    90 MICROgram(s) HFA Inhaler 2 Puff(s) Inhalation every 6 hours PRN Shortness of Breath and/or Wheezing    Allergies    sorbitol (Diarrhea)  dairy products (Diarrhea)  penicillin (Unknown)    Intolerances      REVIEW OF SYSTEMS:    CONSTITUTIONAL: No weakness, fevers or chills  EYES/ENT: No visual changes;  No vertigo or throat pain   NECK: No pain or stiffness  RESPIRATORY: No cough, wheezing, hemoptysis; No shortness of breath  CARDIOVASCULAR: No chest pain or palpitations  GASTROINTESTINAL: No abdominal or epigastric pain. No nausea, vomiting, or hematemesis; No diarrhea or constipation. No melena or hematochezia.  GENITOURINARY: No dysuria, frequency or hematuria  NEUROLOGICAL: No numbness or weakness  SKIN: No itching, burning, rashes, or lesions   All other review of systems is negative unless indicated above      Vital Signs Last 24 Hrs  T(C): 36.8 (01 Oct 2024 07:53), Max: 36.9 (30 Sep 2024 19:49)  T(F): 98.2 (01 Oct 2024 07:53), Max: 98.5 (30 Sep 2024 19:49)  HR: 76 (01 Oct 2024 08:01) (58 - 79)  RADIOLOGY & ADDITIONAL TESTS:    CTA Chest 24 IMPRESSION:  No acute pulmonary embolism  Trace bilateral pleural effusions with adjacent atelectasis  BP: 138/79 (01 Oct 2024 07:53) (118/102 - 162/113)  BP(mean): 89 (30 Sep 2024 23:53) (89 - 140)  RR: 18 (01 Oct 2024 07:53) (11 - 22)  SpO2: 94% (01 Oct 2024 07:53) (94% - 100%)    Parameters below as of 01 Oct 2024 07:53  Patient On (Oxygen Delivery Method): room air                          14.8   7.00  )-----------( 179      ( 01 Oct 2024 06:54 )             44.2     10-01    141  |  113[H]  |  19  ----------------------------<  110[H]  4.3   |  23  |  1.42[H]    Ca    8.7      01 Oct 2024 06:54    - TroponinI hsT: <-8.79, <-11.26      PHYSICAL EXAM:    General: WN/WD NAD  Neurology: A&Ox3, nonfocal, JONES x 4  HEENT: NC/AT, neck supple, no JVD, EOMI, PERRL  Respiratory: CTA B/L  CV: RRR, S1S2, no murmurs, rubs or gallops  Abdominal: Soft, NT, ND +BS  Extremities: No edema, + peripheral pulses  Skin: No rashes    24 TTE CONCLUSIONS:   1. Left ventricular cavity is normal in size. Left ventricular systolic function is low normal with an ejection fraction visually estimated at 50 to 55 %. Regional wall motion abnormalities present.   2. Mild left ventricular hypertrophy.   3. Basal and midanterolateral wall, basal inferoseptal segment, and apical lateral segment are abnormal. However, accuracy is limited due to frequent PVC's.   4. Basal and mid anterolateral wall, basal inferoseptal segment, and apical lateral segment are abnormal.   5. Normal left ventricular diastolic function.   6. Left atrium is normal in size.   7. The right atrium is normal in size.   8. Normal right ventricular cavity size, with normal wall thickness, and normal right ventricular systolic function.   9. Structurally normal mitral valve with normal leaflet excursion.  10. Structurally normal tricuspid valve with normal leaflet excursion.  11. Trileaflet aortic valve with normal systolic excursion.  12. The interatrial septum appears intact.  13. No pericardial effusion seen.    RADIOLOGY & ADDITIONAL TESTS:    CTA Chest 24 IMPRESSION:  No acute pulmonary embolism  Trace bilateral pleural effusions with adjacent atelectasis

## 2024-10-01 NOTE — DISCHARGE NOTE NURSING/CASE MANAGEMENT/SOCIAL WORK - PATIENT PORTAL LINK FT
You can access the FollowMyHealth Patient Portal offered by [facility] by registering at the following website: [email or web address]. By joining [employer]’s FollowMyHealth portal, you will also be able to view your health information using other applications (apps) compatible with our system.

## 2024-10-07 DIAGNOSIS — Z88.0 ALLERGY STATUS TO PENICILLIN: ICD-10-CM

## 2024-10-07 DIAGNOSIS — N40.0 BENIGN PROSTATIC HYPERPLASIA WITHOUT LOWER URINARY TRACT SYMPTOMS: ICD-10-CM

## 2024-10-07 DIAGNOSIS — G47.33 OBSTRUCTIVE SLEEP APNEA (ADULT) (PEDIATRIC): ICD-10-CM

## 2024-10-07 DIAGNOSIS — R73.9 HYPERGLYCEMIA, UNSPECIFIED: ICD-10-CM

## 2024-10-07 DIAGNOSIS — Z88.8 ALLERGY STATUS TO OTHER DRUGS, MEDICAMENTS AND BIOLOGICAL SUBSTANCES: ICD-10-CM

## 2024-10-07 DIAGNOSIS — I25.10 ATHEROSCLEROTIC HEART DISEASE OF NATIVE CORONARY ARTERY WITHOUT ANGINA PECTORIS: ICD-10-CM

## 2024-10-07 DIAGNOSIS — I49.3 VENTRICULAR PREMATURE DEPOLARIZATION: ICD-10-CM

## 2024-10-07 DIAGNOSIS — N18.9 CHRONIC KIDNEY DISEASE, UNSPECIFIED: ICD-10-CM

## 2024-10-07 DIAGNOSIS — Z91.011 ALLERGY TO MILK PRODUCTS: ICD-10-CM

## 2024-10-07 DIAGNOSIS — R55 SYNCOPE AND COLLAPSE: ICD-10-CM

## 2024-10-07 DIAGNOSIS — I47.20 VENTRICULAR TACHYCARDIA, UNSPECIFIED: ICD-10-CM

## 2024-10-07 DIAGNOSIS — E66.9 OBESITY, UNSPECIFIED: ICD-10-CM

## 2024-10-14 DIAGNOSIS — Z78.9 OTHER SPECIFIED HEALTH STATUS: ICD-10-CM

## 2024-10-14 DIAGNOSIS — Z80.42 FAMILY HISTORY OF MALIGNANT NEOPLASM OF PROSTATE: ICD-10-CM

## 2024-10-14 DIAGNOSIS — Z82.49 FAMILY HISTORY OF ISCHEMIC HEART DISEASE AND OTHER DISEASES OF THE CIRCULATORY SYSTEM: ICD-10-CM

## 2024-10-14 RX ORDER — ASPIRIN ENTERIC COATED TABLETS 81 MG 81 MG/1
81 TABLET, DELAYED RELEASE ORAL DAILY
Refills: 0 | Status: ACTIVE | COMMUNITY

## 2024-10-14 RX ORDER — BUDESONIDE AND FORMOTEROL FUMARATE DIHYDRATE 160; 4.5 UG/1; UG/1
160-4.5 AEROSOL RESPIRATORY (INHALATION)
Refills: 0 | Status: ACTIVE | COMMUNITY

## 2024-10-14 RX ORDER — ATORVASTATIN CALCIUM 40 MG/1
40 TABLET, FILM COATED ORAL
Refills: 0 | Status: ACTIVE | COMMUNITY

## 2024-10-14 RX ORDER — ALFUZOSIN HYDROCHLORIDE 10 MG/1
10 TABLET, EXTENDED RELEASE ORAL DAILY
Refills: 0 | Status: ACTIVE | COMMUNITY

## 2024-10-14 RX ORDER — METOPROLOL SUCCINATE 25 MG/1
25 TABLET, EXTENDED RELEASE ORAL
Refills: 0 | Status: ACTIVE | COMMUNITY

## 2024-10-14 RX ORDER — PRASUGREL 10 MG/1
10 TABLET, FILM COATED ORAL DAILY
Refills: 0 | Status: ACTIVE | COMMUNITY

## 2024-10-14 RX ORDER — DUTASTERIDE 0.5 MG/1
0.5 CAPSULE, LIQUID FILLED ORAL DAILY
Refills: 0 | Status: ACTIVE | COMMUNITY

## 2024-10-15 ENCOUNTER — NON-APPOINTMENT (OUTPATIENT)
Age: 59
End: 2024-10-15

## 2024-10-15 ENCOUNTER — APPOINTMENT (OUTPATIENT)
Dept: ELECTROPHYSIOLOGY | Facility: CLINIC | Age: 59
End: 2024-10-15
Payer: COMMERCIAL

## 2024-10-15 VITALS
HEIGHT: 70 IN | HEART RATE: 70 BPM | OXYGEN SATURATION: 97 % | DIASTOLIC BLOOD PRESSURE: 67 MMHG | SYSTOLIC BLOOD PRESSURE: 122 MMHG | WEIGHT: 225 LBS | RESPIRATION RATE: 14 BRPM | BODY MASS INDEX: 32.21 KG/M2

## 2024-10-15 DIAGNOSIS — I49.3 VENTRICULAR PREMATURE DEPOLARIZATION: ICD-10-CM

## 2024-10-15 DIAGNOSIS — G47.30 SLEEP APNEA, UNSPECIFIED: ICD-10-CM

## 2024-10-15 DIAGNOSIS — Z95.818 PRESENCE OF OTHER CARDIAC IMPLANTS AND GRAFTS: ICD-10-CM

## 2024-10-15 DIAGNOSIS — Z87.898 PERSONAL HISTORY OF OTHER SPECIFIED CONDITIONS: ICD-10-CM

## 2024-10-15 DIAGNOSIS — I25.118 ATHEROSCLEROTIC HEART DISEASE OF NATIVE CORONARY ARTERY WITH OTHER FORMS OF ANGINA PECTORIS: ICD-10-CM

## 2024-10-15 PROCEDURE — 93285 PRGRMG DEV EVAL SCRMS IP: CPT

## 2024-10-15 PROCEDURE — 99212 OFFICE O/P EST SF 10 MIN: CPT

## 2024-11-17 ENCOUNTER — EMERGENCY (EMERGENCY)
Facility: HOSPITAL | Age: 59
LOS: 0 days | Discharge: HOME | End: 2024-11-18
Attending: EMERGENCY MEDICINE
Payer: COMMERCIAL

## 2024-11-17 VITALS
HEIGHT: 70 IN | RESPIRATION RATE: 18 BRPM | OXYGEN SATURATION: 96 % | TEMPERATURE: 98 F | DIASTOLIC BLOOD PRESSURE: 60 MMHG | SYSTOLIC BLOOD PRESSURE: 100 MMHG | WEIGHT: 220.02 LBS | HEART RATE: 72 BPM

## 2024-11-17 DIAGNOSIS — T44.7X5A ADVERSE EFFECT OF BETA-ADRENORECEPTOR ANTAGONISTS, INITIAL ENCOUNTER: ICD-10-CM

## 2024-11-17 DIAGNOSIS — Z91.011 ALLERGY TO MILK PRODUCTS: ICD-10-CM

## 2024-11-17 DIAGNOSIS — R55 SYNCOPE AND COLLAPSE: ICD-10-CM

## 2024-11-17 DIAGNOSIS — E78.5 HYPERLIPIDEMIA, UNSPECIFIED: ICD-10-CM

## 2024-11-17 DIAGNOSIS — Z90.49 ACQUIRED ABSENCE OF OTHER SPECIFIED PARTS OF DIGESTIVE TRACT: Chronic | ICD-10-CM

## 2024-11-17 DIAGNOSIS — Z95.5 PRESENCE OF CORONARY ANGIOPLASTY IMPLANT AND GRAFT: ICD-10-CM

## 2024-11-17 DIAGNOSIS — N40.0 BENIGN PROSTATIC HYPERPLASIA WITHOUT LOWER URINARY TRACT SYMPTOMS: ICD-10-CM

## 2024-11-17 DIAGNOSIS — I10 ESSENTIAL (PRIMARY) HYPERTENSION: ICD-10-CM

## 2024-11-17 DIAGNOSIS — Z88.0 ALLERGY STATUS TO PENICILLIN: ICD-10-CM

## 2024-11-17 DIAGNOSIS — T38.6X5A ADVERSE EFFECT OF ANTIGONADOTROPHINS, ANTIESTROGENS, ANTIANDROGENS, NOT ELSEWHERE CLASSIFIED, INITIAL ENCOUNTER: ICD-10-CM

## 2024-11-17 DIAGNOSIS — I25.2 OLD MYOCARDIAL INFARCTION: ICD-10-CM

## 2024-11-17 DIAGNOSIS — Z88.8 ALLERGY STATUS TO OTHER DRUGS, MEDICAMENTS AND BIOLOGICAL SUBSTANCES: ICD-10-CM

## 2024-11-17 DIAGNOSIS — I25.10 ATHEROSCLEROTIC HEART DISEASE OF NATIVE CORONARY ARTERY WITHOUT ANGINA PECTORIS: ICD-10-CM

## 2024-11-17 DIAGNOSIS — G47.33 OBSTRUCTIVE SLEEP APNEA (ADULT) (PEDIATRIC): ICD-10-CM

## 2024-11-17 DIAGNOSIS — Z86.39 PERSONAL HISTORY OF OTHER ENDOCRINE, NUTRITIONAL AND METABOLIC DISEASE: ICD-10-CM

## 2024-11-17 DIAGNOSIS — R00.1 BRADYCARDIA, UNSPECIFIED: ICD-10-CM

## 2024-11-17 DIAGNOSIS — I95.1 ORTHOSTATIC HYPOTENSION: ICD-10-CM

## 2024-11-17 DIAGNOSIS — T44.8X5A ADVERSE EFFECT OF CENTRALLY-ACTING AND ADRENERGIC-NEURON-BLOCKING AGENTS, INITIAL ENCOUNTER: ICD-10-CM

## 2024-11-17 LAB
BASOPHILS # BLD AUTO: 0.03 K/UL — SIGNIFICANT CHANGE UP (ref 0–0.2)
BASOPHILS NFR BLD AUTO: 0.4 % — SIGNIFICANT CHANGE UP (ref 0–2)
EOSINOPHIL # BLD AUTO: 0.12 K/UL — SIGNIFICANT CHANGE UP (ref 0–0.5)
EOSINOPHIL NFR BLD AUTO: 1.7 % — SIGNIFICANT CHANGE UP (ref 0–6)
HCT VFR BLD CALC: 41.2 % — SIGNIFICANT CHANGE UP (ref 39–50)
HGB BLD-MCNC: 13.6 G/DL — SIGNIFICANT CHANGE UP (ref 13–17)
IMM GRANULOCYTES NFR BLD AUTO: 0.1 % — SIGNIFICANT CHANGE UP (ref 0–0.9)
LYMPHOCYTES # BLD AUTO: 1.58 K/UL — SIGNIFICANT CHANGE UP (ref 1–3.3)
LYMPHOCYTES # BLD AUTO: 22.5 % — SIGNIFICANT CHANGE UP (ref 13–44)
MCHC RBC-ENTMCNC: 31.9 PG — SIGNIFICANT CHANGE UP (ref 27–34)
MCHC RBC-ENTMCNC: 33 G/DL — SIGNIFICANT CHANGE UP (ref 32–36)
MCV RBC AUTO: 96.7 FL — SIGNIFICANT CHANGE UP (ref 80–100)
MONOCYTES # BLD AUTO: 0.76 K/UL — SIGNIFICANT CHANGE UP (ref 0–0.9)
MONOCYTES NFR BLD AUTO: 10.8 % — SIGNIFICANT CHANGE UP (ref 2–14)
NEUTROPHILS # BLD AUTO: 4.52 K/UL — SIGNIFICANT CHANGE UP (ref 1.8–7.4)
NEUTROPHILS NFR BLD AUTO: 64.5 % — SIGNIFICANT CHANGE UP (ref 43–77)
PLATELET # BLD AUTO: 181 K/UL — SIGNIFICANT CHANGE UP (ref 150–400)
RBC # BLD: 4.26 M/UL — SIGNIFICANT CHANGE UP (ref 4.2–5.8)
RBC # FLD: 12.6 % — SIGNIFICANT CHANGE UP (ref 10.3–14.5)
WBC # BLD: 7.02 K/UL — SIGNIFICANT CHANGE UP (ref 3.8–10.5)
WBC # FLD AUTO: 7.02 K/UL — SIGNIFICANT CHANGE UP (ref 3.8–10.5)

## 2024-11-17 PROCEDURE — 80053 COMPREHEN METABOLIC PANEL: CPT

## 2024-11-17 PROCEDURE — 71045 X-RAY EXAM CHEST 1 VIEW: CPT

## 2024-11-17 PROCEDURE — 85025 COMPLETE CBC W/AUTO DIFF WBC: CPT

## 2024-11-17 PROCEDURE — 99285 EMERGENCY DEPT VISIT HI MDM: CPT | Mod: 25

## 2024-11-17 PROCEDURE — 84484 ASSAY OF TROPONIN QUANT: CPT

## 2024-11-17 PROCEDURE — 93005 ELECTROCARDIOGRAM TRACING: CPT

## 2024-11-17 PROCEDURE — 82962 GLUCOSE BLOOD TEST: CPT

## 2024-11-17 PROCEDURE — 93010 ELECTROCARDIOGRAM REPORT: CPT

## 2024-11-17 PROCEDURE — 36415 COLL VENOUS BLD VENIPUNCTURE: CPT

## 2024-11-17 PROCEDURE — 85730 THROMBOPLASTIN TIME PARTIAL: CPT

## 2024-11-17 PROCEDURE — 99285 EMERGENCY DEPT VISIT HI MDM: CPT

## 2024-11-17 PROCEDURE — 85610 PROTHROMBIN TIME: CPT

## 2024-11-17 PROCEDURE — 83735 ASSAY OF MAGNESIUM: CPT

## 2024-11-17 NOTE — ED PROVIDER NOTE - NSFOLLOWUPINSTRUCTIONS_ED_ALL_ED_FT
English    Orthostatic Hypotension  Blood pressure is a measurement of how strongly, or weakly, your circulating blood is pressing against the walls of your arteries. Orthostatic hypotension is a drop in blood pressure that can happen when you change positions, such as when you go from lying down to standing.    Arteries are blood vessels that carry blood from your heart throughout your body. When blood pressure is too low, you may not get enough blood to your brain or to the rest of your organs. Orthostatic hypotension can cause light-headedness, sweating, rapid heartbeat, blurred vision, and fainting. These symptoms require further investigation into the cause.    What are the causes?  Orthostatic hypotension can be caused by many things, including:  Sudden changes in posture, such as standing up quickly after you have been sitting or lying down.  Loss of blood (anemia) or loss of body fluids (dehydration).  Heart problems, neurologic problems, or hormone problems.  Pregnancy.  Aging. The risk for this condition increases as you get older.  Severe infection (sepsis).  Certain medicines, such as medicines for high blood pressure or medicines that make the body lose excess fluids (diuretics).  What are the signs or symptoms?  Symptoms of this condition may include:  Weakness, light-headedness, or dizziness.  Sweating.  Blurred vision.  Tiredness (fatigue).  Rapid heartbeat.  Fainting, in severe cases.  How is this diagnosed?  This condition is diagnosed based on:  Your symptoms and medical history.  Your blood pressure measurements. Your health care provider will check your blood pressure when you are:  Lying down.  Sitting.  Standing.  A blood pressure reading is recorded as two numbers, such as "120 over 80" (or 120/80). The first ("top") number is called the systolic pressure. It is a measure of the pressure in your arteries as your heart beats. The second ("bottom") number is called the diastolic pressure. It is a measure of the pressure in your arteries when your heart relaxes between beats. Blood pressure is measured in a unit called mmHg. Healthy blood pressure for most adults is 120/80 mmHg. Orthostatic hypotension is defined as a 20 mmHg drop in systolic pressure or a 10 mmHg drop in diastolic pressure within 3 minutes of standing.    Other information or tests that may be used to diagnose orthostatic hypotension include:  Your other vital signs, such as your heart rate and temperature.  Blood tests.  An electrocardiogram (ECG) or echocardiogram.  A Holter monitor. This is a device you wear that records your heart rhythm continuously, usually for 24–48 hours.  Tilt table test. For this test, you will be safely secured to a table that moves you from a lying position to an upright position. Your heart rhythm and blood pressure will be monitored during the test.  How is this treated?  This condition may be treated by:  Changing your diet. This may involve eating more salt (sodium) or drinking more water.  Changing the dosage of certain medicines you are taking that might be lowering your blood pressure.  Correcting the underlying reason for the orthostatic hypotension.  Wearing compression stockings.  Taking medicines to raise your blood pressure.  Avoiding actions that trigger symptoms.  Follow these instructions at home:  Medicines    Take over-the-counter and prescription medicines only as told by your health care provider.  Follow instructions from your health care provider about changing the dosage of your current medicines, if this applies.  Do not stop or adjust any of your medicines on your own.  Eating and drinking    Illustration of a person drinking a glass of water.  Drink enough fluid to keep your urine pale yellow.  Eat extra salt only as directed. Do not add extra salt to your diet unless advised by your health care provider.  Eat frequent, small meals.  Avoid standing up suddenly after eating.  General instructions    Compression stockings on a person's lower legs.  Get up slowly from lying down or sitting positions. This gives your blood pressure a chance to adjust.  Avoid hot showers and excessive heat as directed by your health care provider.  Engage in regular physical activity as directed by your health care provider.  If you have compression stockings, wear them as told.  Keep all follow-up visits. This is important.  Contact a health care provider if:  You have a fever for more than 2–3 days.  You feel more thirsty than usual.  You feel dizzy or weak.  Get help right away if:  You have chest pain.  You have a fast or irregular heartbeat.  You become sweaty or feel light-headed.  You feel short of breath.  You faint.  You have any symptoms of a stroke. "BE FAST" is an easy way to remember the main warning signs of a stroke:  B - Balance. Signs are dizziness, sudden trouble walking, or loss of balance.  E - Eyes. Signs are trouble seeing or a sudden change in vision.  F - Face. Signs are sudden weakness or numbness of the face, or the face or eyelid drooping on one side.  A - Arms. Signs are weakness or numbness in an arm. This happens suddenly and usually on one side of the body.  S - Speech. Signs are sudden trouble speaking, slurred speech, or trouble understanding what people say.  T - Time. Time to call emergency services. Write down what time symptoms started.  You have other signs of a stroke, such as:  A sudden, severe headache with no known cause.  Nausea or vomiting.  Seizure.  These symptoms may represent a serious problem that is an emergency. Do not wait to see if the symptoms will go away. Get medical help right away. Call your local emergency services (911 in the [place] Do not drive yourself to the hospital.    Summary  Orthostatic hypotension is a sudden drop in blood pressure.  It can cause light-headedness, sweating, rapid heartbeat, blurred vision, and fainting.  Orthostatic hypotension can be diagnosed by having your blood pressure taken while lying down, sitting, and then standing.  Treatment may involve changing your diet, wearing compression stockings, sitting up slowly, adjusting your medicines, or correcting the underlying reason for the orthostatic hypotension.  Get help right away if you have chest pain, a fast or irregular heartbeat, or symptoms of a stroke.  This information is not intended to replace advice given to you by your health care provider. Make sure you discuss any questions you have with your health care provider.    Document Revised: 03/03/2022 Document Reviewed: 03/03/2022  [employer] Patient Education © 2024 [employer]  [employer] logo  Terms and Conditions  Privacy Policy  Editorial Policy  All content on this site: Copyright © 2024 [employer], its licensors, and contributors. All rights are reserved, including those for text and data mining, AI training, and similar technologies. For all open access content, the Creative Commons licensing terms apply.  Cookies are used by this site. To decline or learn more, visit our Cookies page.  [employer]

## 2024-11-17 NOTE — ED PROVIDER NOTE - OBJECTIVE STATEMENT
Pt. is a 57 yo M with hx of CAD, MI, cardiac stent 1.5 months ago at [facility] with Dr. Garcia, hx of HTN, hyperlipidemia, BPH, JANAY, obesity, hx of PVCs, cardiac loop recorder presents due to dizziness when standing at home.  Patient states he forgot to take AM BP meds today and took them 1 hour before his evening BP,. BPH, and cholesterol meds tonight.  He went to sleep on the couch and wife woke him to go to bed.  He stood up abruptly and walked to the bathroom and became pale and sweaty and almost passed  out.  Wife called 911 and patient states his symptoms resolved when EMS arrived.  Patient denies recent illness, chest pain, SOB, back pain, dizziness, nausea or vomiting.

## 2024-11-17 NOTE — ED ADULT NURSE NOTE - OBJECTIVE STATEMENT
58yM AOx4 ambulatory from home, BIBEMS c/o near syncopal episode this evening. pt reports taking his "morning and night pills too close together" when he got "up from the couch and became dizzy/ lightheaded, diaphoretic, and nauseous". pt denies chest pain, sob. - head strike, - LOC, + AC use. PMH stent. IV inserted, labs drawn and sent, ekg completed. [facility].

## 2024-11-17 NOTE — ED ADULT NURSE NOTE - HISTORY OF COVID-19 VACCINATION
Ice Massage    Fill a dozen 6 oz. Paper cups with water and freeze them. When ready for massage, peel the paper from one frozen cup and wet it. Patient lies on stomach while the masseur rolls the ice cylinder over the sore tight muscles. When the patient can no longer tolerate the massage (usually 5-10 minutes), discard the ice. Patient lies prone for another five minutes to allow warming and blood flow into the muscles. Repeat 2-3 times per day.   Massage gun.    
Vaccine status unknown

## 2024-11-17 NOTE — ED PROVIDER NOTE - CARE PLAN
Principal Discharge DX:	Orthostatic hypotension  Secondary Diagnosis:	Medication side effects present, initial encounter  Secondary Diagnosis:	Near syncope   1

## 2024-11-17 NOTE — ED ADULT TRIAGE NOTE - CHIEF COMPLAINT QUOTE
BIBEMS from home c/o near syncopal episode. PMH s/p stent 2x months ago +Anticoagulation. Reports going to stand up, feeling lightheaded and got diaphoretic. Reports taking cardiac medication "out of my normal order" today. Denies dizziness, nausea/ vomiting.  in triage .Sent for STAT EKG. BIBEMS from home c/o near syncopal episode. PMH s/p stent 2x months ago +Anticoagulation. Reports going to stand up, feeling lightheaded and got diaphoretic. Reports taking cardiac medication "out of my normal order" today. Denies dizziness, chest pain, nausea/ vomiting.  in triage .Sent for STAT EKG. IV initiated by EMS to [facility].

## 2024-11-17 NOTE — ED PROVIDER NOTE - PSYCHIATRIC, MLM
98.5 Alert and oriented to person, place, time/situation. normal mood and affect. no apparent risk to self or others.

## 2024-11-17 NOTE — ED ADULT NURSE NOTE - CHIEF COMPLAINT QUOTE
BIBEMS from home c/o near syncopal episode. PMH s/p stent 2x months ago +Anticoagulation. Reports going to stand up, feeling lightheaded and got diaphoretic. Reports taking cardiac medication "out of my normal order" today. Denies dizziness, chest pain, nausea/ vomiting.  in triage .Sent for STAT EKG. IV initiated by EMS to [facility].

## 2024-11-17 NOTE — ED PROVIDER NOTE - PATIENT PORTAL LINK FT
You can access the FollowMyHealth Patient Portal offered by [facility] by registering at the following website: [email or web address]. By joining [facility]’s FollowMyHealth portal, you will also be able to view your health information using other applications (apps) compatible with our system.

## 2024-11-17 NOTE — ED PROVIDER NOTE - CARE PROVIDER_API CALL
Hudson Garcia  Interventional Cardiology  [address] [place] [address], [place] [address]  Phone: [phone]  Fax: [phone]  Follow Up Time:

## 2024-11-17 NOTE — ED PROVIDER NOTE - CROS ED ROS STATEMENT
LM on mom's VM to call back. It is to discuss concerns, intake packet, and plan of care.    all other ROS negative except as per HPI

## 2024-11-17 NOTE — ED ADULT NURSE NOTE - NSFALLHARMRISKINTERV_ED_ALL_ED
Assistance OOB with selected safe patient handling equipment if applicable/Communicate risk of Fall with Harm to all staff, patient, and family/Orthostatic vital signs/Provide visual cue: red socks, yellow wristband, yellow gown, etc/Reinforce activity limits and safety measures with patient and family/Bed in lowest position, wheels locked, appropriate side rails in place/Call bell, personal items and telephone in reach/Instruct patient to call for assistance before getting out of bed/chair/stretcher/Non-slip footwear applied when patient is off stretcher/[place] to call system/Physically safe environment - no spills, clutter or unnecessary equipment/Purposeful Proactive Rounding/Room/bathroom lighting operational, light cord in reach

## 2024-11-17 NOTE — ED PROVIDER NOTE - CLINICAL SUMMARY MEDICAL DECISION MAKING FREE TEXT BOX
59 yo M with near syncope; meds taken too close in time to one another.  Meds are metoprolol and dutasteride and alfuzosin all taken within an hour from each other.  Used to spreading out throughout the day.  Hx of cardiac stent 1.5 mos ago.  EKG showing sinus bradycardia, no ST changes.  Will get labs including troponins.  Will get orthostatic vital signs and may hydrate. If 2 sets of cardiac enzymes normal, will have patient follow up with his cardiologist in office.

## 2024-11-18 VITALS
RESPIRATION RATE: 18 BRPM | OXYGEN SATURATION: 94 % | HEART RATE: 53 BPM | TEMPERATURE: 98 F | DIASTOLIC BLOOD PRESSURE: 69 MMHG | SYSTOLIC BLOOD PRESSURE: 114 MMHG

## 2024-11-18 LAB
ALBUMIN SERPL ELPH-MCNC: 3.2 G/DL — LOW (ref 3.3–5)
ALP SERPL-CCNC: 66 U/L — SIGNIFICANT CHANGE UP (ref 40–120)
ALT FLD-CCNC: 46 U/L — SIGNIFICANT CHANGE UP (ref 12–78)
ANION GAP SERPL CALC-SCNC: 1 MMOL/L — LOW (ref 5–17)
APTT BLD: 30 SEC — SIGNIFICANT CHANGE UP (ref 24.5–35.6)
AST SERPL-CCNC: 24 U/L — SIGNIFICANT CHANGE UP (ref 15–37)
BILIRUB SERPL-MCNC: 1.1 MG/DL — SIGNIFICANT CHANGE UP (ref 0.2–1.2)
BUN SERPL-MCNC: 18 MG/DL — SIGNIFICANT CHANGE UP (ref 7–23)
CALCIUM SERPL-MCNC: 8.6 MG/DL — SIGNIFICANT CHANGE UP (ref 8.5–10.1)
CHLORIDE SERPL-SCNC: 112 MMOL/L — HIGH (ref 96–108)
CO2 SERPL-SCNC: 26 MMOL/L — SIGNIFICANT CHANGE UP (ref 22–31)
CREAT SERPL-MCNC: 1.34 MG/DL — HIGH (ref 0.5–1.3)
EGFR: 62 ML/MIN/1.73M2 — SIGNIFICANT CHANGE UP
GLUCOSE SERPL-MCNC: 146 MG/DL — HIGH (ref 70–99)
INR BLD: 1.11 RATIO — SIGNIFICANT CHANGE UP (ref 0.85–1.16)
MAGNESIUM SERPL-MCNC: 2.1 MG/DL — SIGNIFICANT CHANGE UP (ref 1.6–2.6)
POTASSIUM SERPL-MCNC: 3.8 MMOL/L — SIGNIFICANT CHANGE UP (ref 3.5–5.3)
POTASSIUM SERPL-SCNC: 3.8 MMOL/L — SIGNIFICANT CHANGE UP (ref 3.5–5.3)
PROT SERPL-MCNC: 6.3 GM/DL — SIGNIFICANT CHANGE UP (ref 6–8.3)
PROTHROM AB SERPL-ACNC: 12.8 SEC — SIGNIFICANT CHANGE UP (ref 9.9–13.4)
SODIUM SERPL-SCNC: 139 MMOL/L — SIGNIFICANT CHANGE UP (ref 135–145)
TROPONIN I, HIGH SENSITIVITY RESULT: 5.26 NG/L — SIGNIFICANT CHANGE UP
TROPONIN I, HIGH SENSITIVITY RESULT: 5.46 NG/L — SIGNIFICANT CHANGE UP

## 2024-11-18 PROCEDURE — 71045 X-RAY EXAM CHEST 1 VIEW: CPT | Mod: 26

## 2024-11-18 RX ORDER — SODIUM CHLORIDE 9 MG/ML
1000 INJECTION, SOLUTION INTRAMUSCULAR; INTRAVENOUS; SUBCUTANEOUS ONCE
Refills: 0 | Status: COMPLETED | OUTPATIENT
Start: 2024-11-18 | End: 2024-11-18

## 2024-11-18 RX ADMIN — SODIUM CHLORIDE 1000 MILLILITER(S): 9 INJECTION, SOLUTION INTRAMUSCULAR; INTRAVENOUS; SUBCUTANEOUS at 00:07

## 2024-11-18 RX ADMIN — SODIUM CHLORIDE 1000 MILLILITER(S): 9 INJECTION, SOLUTION INTRAMUSCULAR; INTRAVENOUS; SUBCUTANEOUS at 01:27

## 2024-11-19 ENCOUNTER — NON-APPOINTMENT (OUTPATIENT)
Age: 59
End: 2024-11-19

## 2024-11-19 ENCOUNTER — APPOINTMENT (OUTPATIENT)
Dept: ELECTROPHYSIOLOGY | Facility: CLINIC | Age: 59
End: 2024-11-19
Payer: COMMERCIAL

## 2024-11-19 PROCEDURE — 93298 REM INTERROG DEV EVAL SCRMS: CPT

## 2024-12-24 ENCOUNTER — NON-APPOINTMENT (OUTPATIENT)
Age: 59
End: 2024-12-24

## 2024-12-24 ENCOUNTER — APPOINTMENT (OUTPATIENT)
Dept: ELECTROPHYSIOLOGY | Facility: CLINIC | Age: 59
End: 2024-12-24
Payer: SELF-PAY

## 2024-12-24 PROCEDURE — 93298 REM INTERROG DEV EVAL SCRMS: CPT

## 2025-01-28 ENCOUNTER — NON-APPOINTMENT (OUTPATIENT)
Age: 60
End: 2025-01-28

## 2025-01-28 ENCOUNTER — APPOINTMENT (OUTPATIENT)
Dept: ELECTROPHYSIOLOGY | Facility: CLINIC | Age: 60
End: 2025-01-28
Payer: COMMERCIAL

## 2025-01-28 PROCEDURE — 93298 REM INTERROG DEV EVAL SCRMS: CPT

## 2025-03-04 ENCOUNTER — NON-APPOINTMENT (OUTPATIENT)
Age: 60
End: 2025-03-04

## 2025-03-04 ENCOUNTER — APPOINTMENT (OUTPATIENT)
Dept: ELECTROPHYSIOLOGY | Facility: CLINIC | Age: 60
End: 2025-03-04
Payer: COMMERCIAL

## 2025-03-04 PROCEDURE — 93298 REM INTERROG DEV EVAL SCRMS: CPT

## 2025-04-18 ENCOUNTER — APPOINTMENT (OUTPATIENT)
Dept: ELECTROPHYSIOLOGY | Facility: CLINIC | Age: 60
End: 2025-04-18
Payer: COMMERCIAL

## 2025-04-18 VITALS
HEIGHT: 70 IN | SYSTOLIC BLOOD PRESSURE: 128 MMHG | WEIGHT: 220 LBS | OXYGEN SATURATION: 96 % | DIASTOLIC BLOOD PRESSURE: 78 MMHG | BODY MASS INDEX: 31.5 KG/M2 | HEART RATE: 73 BPM

## 2025-04-18 DIAGNOSIS — Z87.898 PERSONAL HISTORY OF OTHER SPECIFIED CONDITIONS: ICD-10-CM

## 2025-04-18 DIAGNOSIS — Z45.09 ENCOUNTER FOR ADJUSTMENT AND MANAGEMENT OF OTHER CARDIAC DEVICE: ICD-10-CM

## 2025-04-18 PROCEDURE — 99202 OFFICE O/P NEW SF 15 MIN: CPT

## 2025-05-19 ENCOUNTER — APPOINTMENT (OUTPATIENT)
Dept: ELECTROPHYSIOLOGY | Facility: CLINIC | Age: 60
End: 2025-05-19
Payer: COMMERCIAL

## 2025-05-19 ENCOUNTER — NON-APPOINTMENT (OUTPATIENT)
Age: 60
End: 2025-05-19

## 2025-05-19 PROCEDURE — 93298 REM INTERROG DEV EVAL SCRMS: CPT

## 2025-06-20 ENCOUNTER — NON-APPOINTMENT (OUTPATIENT)
Age: 60
End: 2025-06-20

## 2025-06-20 ENCOUNTER — APPOINTMENT (OUTPATIENT)
Dept: ELECTROPHYSIOLOGY | Facility: CLINIC | Age: 60
End: 2025-06-20
Payer: COMMERCIAL

## 2025-06-20 PROCEDURE — 93298 REM INTERROG DEV EVAL SCRMS: CPT

## 2025-07-25 ENCOUNTER — NON-APPOINTMENT (OUTPATIENT)
Age: 60
End: 2025-07-25

## 2025-07-25 ENCOUNTER — APPOINTMENT (OUTPATIENT)
Dept: ELECTROPHYSIOLOGY | Facility: CLINIC | Age: 60
End: 2025-07-25
Payer: COMMERCIAL

## 2025-07-25 PROCEDURE — 93298 REM INTERROG DEV EVAL SCRMS: CPT

## 2025-08-05 ENCOUNTER — NON-APPOINTMENT (OUTPATIENT)
Age: 60
End: 2025-08-05

## 2025-08-29 ENCOUNTER — APPOINTMENT (OUTPATIENT)
Dept: ELECTROPHYSIOLOGY | Facility: CLINIC | Age: 60
End: 2025-08-29

## 2025-08-29 ENCOUNTER — NON-APPOINTMENT (OUTPATIENT)
Age: 60
End: 2025-08-29

## 2025-08-29 PROCEDURE — 93298 REM INTERROG DEV EVAL SCRMS: CPT
